# Patient Record
Sex: FEMALE | Race: BLACK OR AFRICAN AMERICAN | Employment: FULL TIME | URBAN - METROPOLITAN AREA
[De-identification: names, ages, dates, MRNs, and addresses within clinical notes are randomized per-mention and may not be internally consistent; named-entity substitution may affect disease eponyms.]

---

## 2018-09-27 ENCOUNTER — HOSPITAL ENCOUNTER (OUTPATIENT)
Dept: OCCUPATIONAL MEDICINE | Age: 53
Discharge: HOME | End: 2018-09-27

## 2018-09-27 DIAGNOSIS — T14.90XA INJURY: ICD-10-CM

## 2018-11-28 ENCOUNTER — HOSPITAL ENCOUNTER (OUTPATIENT)
Dept: PHYSICAL THERAPY | Age: 53
Discharge: HOME | End: 2018-11-28
Payer: COMMERCIAL

## 2018-11-28 PROCEDURE — 97110 THERAPEUTIC EXERCISES: CPT

## 2018-11-28 PROCEDURE — 97161 PT EVAL LOW COMPLEX 20 MIN: CPT

## 2018-11-28 PROCEDURE — 97140 MANUAL THERAPY 1/> REGIONS: CPT

## 2018-11-28 NOTE — THERAPY EVALUATION
Romero Anson  : 1965  Payor: Henry Acharya / Plan: Floridalma Mendoza / Product Type: Workers Comp /  [address]  at [facility], [address], [place] [address]  [phone]   [phone]        [address] 52 Assessment 2018   ICD-10: Treatment Diagnosis: Pain in right hand (M79.641),   Precautions/Allergies:   Patient has no allergy information on record. MD Orders: evaluate and treat. Work restrictions: limit use of right thumb, wear thumb spica MEDICAL/REFERRING DIAGNOSIS:  Pain in right hand [M79.641]  Radial styloid tenosynovitis (de quervain) [M65.4]   DATE OF ONSET: summer 2018  REFERRING PHYSICIAN: Fernando Cabrera MD  RETURN PHYSICIAN APPOINTMENT: --     INITIAL ASSESSMENT:  Ms. Dashawn Michael presents with R thumb pain, limited movement at the R [facility] joint, and indications of De Quervains tenosynovitis. Pt will benefit from skilled physical therapy to address dysfunctions and pain. PROBLEM LIST (Impacting functional limitations):  1. Decreased Strength  2. Decreased ADL/Functional Activities  3. Increased Pain  4. Decreased Flexibility/Joint Mobility  5. Decreased [place] with Home Exercise Program INTERVENTIONS PLANNED:  1. Home Exercise Program (HEP)  2. Manual Therapy including joint and soft tissue manipulation and mobilization, and dry needling  3. Therapeutic Exercise/Strengthening  4. Modalities including heat/cold application and electrical stimulation   TREATMENT PLAN:  Effective Dates: 18 TO 19. Frequency/Duration: 2 times a week for 3 weeks  GOALS: (Goals have been discussed and agreed upon with patient.)  Short-Term Functional Goals: Time Frame: 3 weeks  1. Pt will be independent with > or = 2 exercises in HEP. 2. Pt will demonstrate > 5 degrees improvement in thumb movement all directions  3. Pt will report < or = 37 on DASH. Discharge Goals: Time Frame: 6 weeks  1.  Pt will be independent with > or = 4 exercises in HEP. 2. Pt will report < or = 28 on DASH. 3. Pt will demonstrate functional R thumb AROM and strength. Rehabilitation Potential For Stated Goals: Good  Regarding Darleen Cornejo's therapy, I certify that the treatment plan above will be carried out by a therapist or under their direction. Thank you for this referral,  Trisha Field, PT, DPT                 The information in this section was collected on 11/28/18 (except where otherwise noted). HISTORY:   History of Present Injury/Illness (Reason for Referral):  Pt has R thumb, wrist pain. 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 Low levels of pain for about 1 year, but the pain became bad this summer and she went open workers comp claim in early fall. Has been through PT for this previously this fall, resulted in no change. Radiographs found potential old broken bones in R CMC region and tendonitis. Past Medical History/Comorbidities:   Ms. Kenya Gutierrez  has no past medical history on file. Ms. Kenya Gutierrez  has no past surgical history on file. Social History/Living Environment:     house  Prior Level of Function/Work/Activity:  Not currently working due to restrictions  Dominant Side:         RIGHT       Ambulatory/Rehab Services H2 Model Falls Risk Assessment    Risk Factors:       No Risk Factors Identified Ability to Rise from Chair:       (0)  Ability to rise in a single movement    Falls Prevention Plan:       No modifications necessary   Total: (5 or greater = High Risk): 0    ©2010 [facility] [address] [place] [address] States Patent #[id number]. Federal Law prohibits the replication, distribution or use without written permission from [facility] [employer]     Current Medications:     No current outpatient medications on file.    Date Last Reviewed:  11/28/2018   Number of Personal Factors/Comorbidities that affect the Plan of Care: 0: LOW COMPLEXITY   EXAMINATION: Observation/Orthostatic Postural Assessment:          Standing: no abnormalities noted        Ambulation: no abnormalities noted    Palpation:          Increased tenderness to palpation over R thumb DIP and CMC joints, and trigger points in thenar eminence        Increased tenderness to cross friction palpation of tendons over radial styloid R hand    ROM/Strength  Joint/Movement  Range of Motion- eval ROM - reassess  Date:  Strength- eval Strength - reassess  Date:   Thumb extension R: 45 degrees  L: WFL  3+/5    Thumb flexion R: 10 degrees  L: WFL  3/5 with pain    Thumb abduction R: 40 degrees  L: WFL  3+/5 with pain     (in lbs of force)   R: 19, 17, 17  L: 22, 25, 23        Balance:          No deficits noted    Special test:      Finklestein's: positive for de quervains     Body Structures Involved:  1. Bones  2. Joints  3. Muscles Body Functions Affected:  1. Sensory/Pain  2. Neuromusculoskeletal  3. Movement Related Activities and Participation Affected:  1. General Tasks and Demands  2. Mobility  3. Community, Social and [place]   Number of elements (examined above) that affect the Plan of Care: 1-2: LOW COMPLEXITY   CLINICAL PRESENTATION:   Presentation: Stable and uncomplicated: LOW COMPLEXITY   CLINICAL DECISION MAKING:   Outcome Measure: Tool Used: Disabilities of the Arm, Shoulder and Hand (DASH) Questionnaire - Quick Version  Score:  Initial: 42/55  Most Recent: X/55 (Date: -- )   Interpretation of Score: The DASH is designed to measure the activities of daily living in person's with upper extremity dysfunction or pain. Each section is scored on a 1-5 scale, 5 representing the greatest disability. The scores of each section are added together for a total score of 55. Score 11 12-19 20-28 29-37 38-45 46-54 55   Modifier CH CI CJ CK CL CM CN     Medical Necessity:   · Skilled intervention continues to be required due to decreased function.   Reason for Services/Other Comments:  · Patient continues to require skilled intervention due to decreased function. Use of outcome tool(s) and clinical judgement create a POC that gives a: Clear prediction of patient's progress: LOW COMPLEXITY            TREATMENT:   (In addition to Assessment/Re-Assessment sessions the following treatments were rendered)  Pre-treatment Symptoms/Complaints:  5/10 pain with all thumb movements  Pain: Initial:     5/10 Post Session:  5/10     THERAPEUTIC EXERCISE: (10 minutes):  Exercises per grid below to improve mobility and strength. Date:  11/28 Date:   Date:     Activity/Exercise Parameters Parameters Parameters         Thumb flexion/extension X 10     Thumb abduction X 10                                 MANUAL THERAPY: (10 minutes): To increase motion and reduce pain  - distraction at R [facility] joint  - PROM of R CMC joint    MODALITIES: ( 10 minutes): for pain reduction  - cold pack to R hand     Treatment/Session Assessment:    · Response to Treatment:  Pt demonstrated understanding of exercises, reported decreased stiffness of right thumb after manual therapy. Encouraged her to not wear brace on thumb when not using hand, use brace when doing lifting, gripping activities. · Compliance with Program/Exercises: Will assess as treatment progresses. · Recommendations/Intent for next treatment session: \"Next visit will focus on advancements to more challenging activities\".   Total Treatment Duration:  PT Patient Time In/Time Out  Time In: 0945  Time Out: 6629    Future Appointments   Date Time Provider Jamaal Russell   11/30/2018  9:15 AM Deja Connelly, PT, DPT [facility]   12/5/2018  8:30 AM Deja Connelly PT, DPT [facility]   12/7/2018  8:30 AM Deja Connelly PT, DPT [facility]   12/10/2018 10:00 AM Deja Connelly PT, DPT [facility]   12/12/2018  9:45 AM Charly Hayden, PT, DPT [facility]       Chelsey Moreno, PT, DPT

## 2018-11-30 ENCOUNTER — HOSPITAL ENCOUNTER (OUTPATIENT)
Dept: PHYSICAL THERAPY | Age: 53
Discharge: HOME | End: 2018-11-30
Payer: COMMERCIAL

## 2018-11-30 PROCEDURE — 97140 MANUAL THERAPY 1/> REGIONS: CPT

## 2018-11-30 PROCEDURE — 97110 THERAPEUTIC EXERCISES: CPT

## 2018-11-30 NOTE — PROGRESS NOTES
Raymundo Lara  : 1965  Payor: Sindy Dove / Plan: Leldon Adam / Product Type: Workers Comp /  [address]  at [facility], [address], [place]  [phone]   [phone]        OUTPATIENT PHYSICAL THERAPY:Daily Note 2018   ICD-10: Treatment Diagnosis: Pain in right hand (M79.641),   Precautions/Allergies:   Patient has no allergy information on record. MD Orders: evaluate and treat. Work restrictions: limit use of right thumb, wear thumb spica MEDICAL/REFERRING DIAGNOSIS:  Pain in right hand [M79.641]  Radial styloid tenosynovitis (de quervain) [M65.4]   DATE OF ONSET: summer 2018  REFERRING PHYSICIAN: Russ Beckham MD  RETURN PHYSICIAN APPOINTMENT: --     INITIAL ASSESSMENT:  Ms. Shon Gill presents with R thumb pain, limited movement at the R [facility] joint, and indications of De Quervains tenosynovitis. Pt will benefit from skilled physical therapy to address dysfunctions and pain. PROBLEM LIST (Impacting functional limitations):  1. Decreased Strength  2. Decreased ADL/Functional Activities  3. Increased Pain  4. Decreased Flexibility/Joint Mobility  5. Decreased [place] with Home Exercise Program INTERVENTIONS PLANNED:  1. Home Exercise Program (HEP)  2. Manual Therapy including joint and soft tissue manipulation and mobilization, and dry needling  3. Therapeutic Exercise/Strengthening  4. Modalities including heat/cold application and electrical stimulation   TREATMENT PLAN:  Effective Dates: 18 TO 19. Frequency/Duration: 2 times a week for 3 weeks  GOALS: (Goals have been discussed and agreed upon with patient.)  Short-Term Functional Goals: Time Frame: 3 weeks  1. Pt will be independent with > or = 2 exercises in HEP. 2. Pt will demonstrate > 5 degrees improvement in thumb movement all directions  3. Pt will report < or = 37 on DASH. Discharge Goals: Time Frame: 6 weeks  1.  Pt will be independent with > or = 4 exercises in HEP. 2. Pt will report < or = 28 on DASH. 3. Pt will demonstrate functional R thumb AROM and strength. Rehabilitation Potential For Stated Goals: Good  Regarding Darleen Cornejo's therapy, I certify that the treatment plan above will be carried out by a therapist or under their direction. Thank you for this referral,  Nam Macias, PT, DPT                 The information in this section was collected on 11/28/18 (except where otherwise noted). HISTORY:   History of Present Injury/Illness (Reason for Referral):  Pt has R thumb, wrist pain. 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 Low levels of pain for about 1 year, but the pain became bad this summer and she went open workers comp claim in early fall. Has been through PT for this previously this fall, resulted in no change. Radiographs found potential old broken bones in R CMC region and tendonitis. Past Medical History/Comorbidities:   Ms. Yasmin Interiano  has no past medical history on file. Ms. Yasmin Interiano  has no past surgical history on file. Social History/Living Environment:     house  Prior Level of Function/Work/Activity:  Not currently working due to restrictions  Dominant Side:         RIGHT       Ambulatory/Rehab Services H2 Model Falls Risk Assessment    Risk Factors:       No Risk Factors Identified Ability to Rise from Chair:       (0)  Ability to rise in a single movement    Falls Prevention Plan:       No modifications necessary   Total: (5 or greater = High Risk): 0    ©2010 [facility] [address] [place] [address] States Patent #[id number]. Federal Law prohibits the replication, distribution or use without written permission from [facility] [employer]     Current Medications:     No current outpatient medications on file.    Date Last Reviewed:  11/30/2018   Number of Personal Factors/Comorbidities that affect the Plan of Care: 0: LOW COMPLEXITY   EXAMINATION:   Observation/Orthostatic Postural Assessment:          Standing: no abnormalities noted        Ambulation: no abnormalities noted    Palpation:          Increased tenderness to palpation over R thumb DIP and CMC joints, and trigger points in thenar eminence        Increased tenderness to cross friction palpation of tendons over radial styloid R hand    ROM/Strength  Joint/Movement  Range of Motion- eval ROM - reassess  Date:  Strength- eval Strength - reassess  Date:   Thumb extension R: 45 degrees  L: WFL  3+/5    Thumb flexion R: 10 degrees  L: WFL  3/5 with pain    Thumb abduction R: 40 degrees  L: WFL  3+/5 with pain     (in lbs of force)   R: 19, 17, 17  L: 22, 25, 23        Balance:          No deficits noted    Special test:      Finklestein's: positive for de quervains     Body Structures Involved:  1. Bones  2. Joints  3. Muscles Body Functions Affected:  1. Sensory/Pain  2. Neuromusculoskeletal  3. Movement Related Activities and Participation Affected:  1. General Tasks and Demands  2. Mobility  3. Community, Social and [place]   Number of elements (examined above) that affect the Plan of Care: 1-2: LOW COMPLEXITY   CLINICAL PRESENTATION:   Presentation: Stable and uncomplicated: LOW COMPLEXITY   CLINICAL DECISION MAKING:   Outcome Measure: Tool Used: Disabilities of the Arm, Shoulder and Hand (DASH) Questionnaire - Quick Version  Score:  Initial: 42/55  Most Recent: X/55 (Date: -- )   Interpretation of Score: The DASH is designed to measure the activities of daily living in person's with upper extremity dysfunction or pain. Each section is scored on a 1-5 scale, 5 representing the greatest disability. The scores of each section are added together for a total score of 55. Score 11 12-19 20-28 29-37 38-45 46-54 55   Modifier CH CI CJ CK CL CM CN     Medical Necessity:   · Skilled intervention continues to be required due to decreased function.   Reason for Services/Other Comments:  · Patient continues to require skilled intervention due to decreased function. Use of outcome tool(s) and clinical judgement create a POC that gives a: Clear prediction of patient's progress: LOW COMPLEXITY            TREATMENT:   (In addition to Assessment/Re-Assessment sessions the following treatments were rendered)  Pre-treatment Symptoms/Complaints:  Pt reports she has been doing the exercises over the past day. Pain: Initial:     5/10 Post Session:  5/10     THERAPEUTIC EXERCISE: (25 minutes):  Exercises per grid below to improve mobility and strength. Date:  11/28 Date:  11/30 Date:     Activity/Exercise Parameters Parameters Parameters   ube  5 min level 1    Thumb flexion/extension X 10 X 10    Thumb abduction X 10 X 10    Palm up thumb lift  X 10    Palm up thumb opposition  X 10    Thenar stretch  10 sec hold x 5    Stretch for radial side of thumb/wrist  10 sec hold x 5    Radial deviation  X 10    Thumb extension  Rubber band resistance x 10    Wrist extension  2# x 20    Wrist flexion  2# x 20              MANUAL THERAPY: (15 minutes): To increase motion and reduce pain  - distraction at R [facility] joint  - PROM of R CMC joint  - MFR manually and using Edge tool to thenar eminence and wrist radial deviator muscles    MODALITIES: ( 5 minutes): for pain reduction  - cold pack to R hand     Treatment/Session Assessment:    · Response to Treatment:  Pt demonstrated small improvement in thumb ROM following manual therapy, reported that the manual therapy \"felt good\". Added thenar stretch to HEP. · Compliance with Program/Exercises: Will assess as treatment progresses. · Recommendations/Intent for next treatment session: \"Next visit will focus on advancements to more challenging activities\".   Total Treatment Duration:  PT Patient Time In/Time Out  Time In: 0930  Time Out: 1025    Future Appointments   Date Time Provider Jamaal Russell   12/5/2018  8:30 AM Cassy Nguyen PT, DPT [facility]   12/7/2018  8:30 AM Jackelyn Vernell Concepcion, PT, DPT [facility]   12/10/2018 10:00 AM Germ[facility]elles PT, [facility]   12/12/2018  9:45 AM [facility]       Valery Crouch, PT, DPT

## 2018-12-07 ENCOUNTER — HOSPITAL ENCOUNTER (OUTPATIENT)
Dept: PHYSICAL THERAPY | Age: 53
Discharge: HOME | End: 2018-12-07
Payer: COMMERCIAL

## 2018-12-07 PROCEDURE — 97014 ELECTRIC STIMULATION THERAPY: CPT

## 2018-12-07 PROCEDURE — 97110 THERAPEUTIC EXERCISES: CPT

## 2018-12-07 PROCEDURE — 97140 MANUAL THERAPY 1/> REGIONS: CPT

## 2018-12-07 PROCEDURE — 97035 APP MDLTY 1+ULTRASOUND EA 15: CPT

## 2018-12-07 NOTE — PROGRESS NOTES
Rudy Dakin  : 1965  Payor: Shayla Melton / Plan: Ridge Almodovar / Product Type: Workers Comp /  [address]  [facility], [address], [address]  [phone]   [phone]        OUTPATIENT PHYSICAL [address] Note 2018   ICD-10: Treatment Diagnosis: Pain in right hand (M79.641),   Precautions/Allergies:   Patient has no allergy information on record. MD Orders: evaluate and treat. Work restrictions: limit use of right thumb, wear thumb spica MEDICAL/REFERRING DIAGNOSIS:  Pain in right hand [M79.641]  Radial styloid tenosynovitis (de quervain) [M65.4]   DATE OF ONSET: summer 2018  REFERRING PHYSICIAN: Rich Andino MD  RETURN PHYSICIAN APPOINTMENT: --     INITIAL ASSESSMENT:  Ms. Angella Prather presents with R thumb pain, limited movement at the R Aia 16 joint, and indications of De Quervains tenosynovitis. Pt will benefit from skilled physical therapy to address dysfunctions and pain. PROBLEM LIST (Impacting functional limitations):  1. Decreased Strength  2. Decreased ADL/Functional Activities  3. Increased Pain  4. Decreased Flexibility/Joint Mobility  5. Decreased [place] with Home Exercise Program INTERVENTIONS PLANNED:  1. Home Exercise Program (HEP)  2. Manual Therapy including joint and soft tissue manipulation and mobilization, and dry needling  3. Therapeutic Exercise/Strengthening  4. Modalities including heat/cold application and electrical stimulation   TREATMENT PLAN:  Effective Dates: 18 TO 19. Frequency/Duration: 2 times a week for 3 weeks  GOALS: (Goals have been discussed and agreed upon with patient.)  Short-Term Functional Goals: Time Frame: 3 weeks  1. Pt will be independent with > or = 2 exercises in HEP. 2. Pt will demonstrate > 5 degrees improvement in thumb movement all directions  3. Pt will report < or = 37 on DASH. Discharge Goals: Time Frame: 6 weeks  1.  Pt will be independent with > or = 4 exercises in HEP. 2. Pt will report < or = 28 on DASH. 3. Pt will demonstrate functional R thumb AROM and strength. Rehabilitation Potential For Stated Goals: Good  Regarding Darleen Cornejo's therapy, I certify that the treatment plan above will be carried out by a therapist or under their direction. Thank you for this referral,  Chelsey Moreno, PT, DPT                 The information in this section was collected on 11/28/18 (except where otherwise noted). HISTORY:   History of Present Injury/Illness (Reason for Referral):  Pt has R thumb, wrist pain. 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 Low levels of pain for about 1 year, but the pain became bad this summer and she went open workers comp claim in early fall. Has been through PT for this previously this fall, resulted in no change. Radiographs found potential old broken bones in R CMC region and tendonitis. Past Medical History/Comorbidities:   Ms. Shagufta Noe  has no past medical history on file. Ms. Shagufta Noe  has no past surgical history on file. Social History/Living Environment:     house  Prior Level of Function/Work/Activity:  Not currently working due to restrictions  Dominant Side:         RIGHT       Ambulatory/Rehab Services H2 Model Falls Risk Assessment    Risk Factors:       No Risk Factors Identified Ability to Rise from Chair:       (0)  Ability to rise in a single movement    Falls Prevention Plan:       No modifications necessary   Total: (5 or greater = High Risk): 0    ©2010 [facility] Ronald [address] [place] [address] States Patent #[id number]. Federal Law prohibits the replication, distribution or use without written permission from [facility] [employer]     Current Medications:     No current outpatient medications on file.    Date Last Reviewed:  12/7/2018   Number of Personal Factors/Comorbidities that affect the Plan of Care: 0: LOW COMPLEXITY   EXAMINATION:   Observation/Orthostatic Postural Assessment:          Standing: no abnormalities noted        Ambulation: no abnormalities noted    Palpation:          Increased tenderness to palpation over R thumb DIP and CMC joints, and trigger points in thenar eminence        Increased tenderness to cross friction palpation of tendons over radial styloid R hand    ROM/Strength  Joint/Movement  Range of Motion- eval ROM - reassess  Date:  Strength- eval Strength - reassess  Date:   Thumb extension R: 45 degrees  L: WFL  3+/5    Thumb flexion R: 10 degrees  L: WFL  3/5 with pain    Thumb abduction R: 40 degrees  L: WFL  3+/5 with pain     (in lbs of force)   R: 19, 17, 17  L: 22, 25, 23        Balance:          No deficits noted    Special test:      Finklestein's: positive for de quervains     Body Structures Involved:  1. Bones  2. Joints  3. Muscles Body Functions Affected:  1. Sensory/Pain  2. Neuromusculoskeletal  3. Movement Related Activities and Participation Affected:  1. General Tasks and Demands  2. Mobility  3. Community, Social and [place]   Number of elements (examined above) that affect the Plan of Care: 1-2: LOW COMPLEXITY   CLINICAL PRESENTATION:   Presentation: Stable and uncomplicated: LOW COMPLEXITY   CLINICAL DECISION MAKING:   Outcome Measure: Tool Used: Disabilities of the Arm, Shoulder and Hand (DASH) Questionnaire - Quick Version  Score:  Initial: 42/55  Most Recent: X/55 (Date: -- )   Interpretation of Score: The DASH is designed to measure the activities of daily living in person's with upper extremity dysfunction or pain. Each section is scored on a 1-5 scale, 5 representing the greatest disability. The scores of each section are added together for a total score of 55. Score 11 12-19 20-28 29-37 38-45 46-54 55   Modifier CH CI CJ CK CL CM CN     Medical Necessity:   · Skilled intervention continues to be required due to decreased function.   Reason for Services/Other Comments:  · Patient continues to require skilled intervention due to decreased function. Use of outcome tool(s) and clinical judgement create a POC that gives a: Clear prediction of patient's progress: LOW COMPLEXITY            TREATMENT:   (In addition to Assessment/Re-Assessment sessions the following treatments were rendered)  Pre-treatment Symptoms/Complaints:  Pt reports 7/10 pain with movement  Pain: Initial:     7/10 Post Session: pain reduced to 6/10 with movement after treatment     THERAPEUTIC EXERCISE: (25 minutes):  Exercises per grid below to improve mobility and strength. Date:  11/28 Date:  11/30 Date:  12/5 12/7   Activity/Exercise Parameters Parameters Parameters    ube  5 min level 1 5 min level 1 4/4 level 1   Thumb flexion/extension X 10 X 10 X 15 X 15   Thumb abduction X 10 X 10 X 15 X 15   Palm up thumb lift  X 10 X 15 X 15   Palm up thumb opposition  X 10 X 15 X 15   Thenar stretch  10 sec hold x 5 10 sec hold x 5 10 sec hold x 5   Stretch for radial side of thumb/wrist  10 sec hold x 5 10 sec hold x 5 5 sec hold x 5   Radial deviation  X 10 X 10 X 15, 2#   Thumb extension  Rubber band resistance x 10 Rubber band resistance x 15 Rubber band resistance   x 15   Thumb flexion   Rubber band resistance x 15 Rubber band resistance x 15   Wrist extension  2# x 20 2# x 20 2# x 20   Wrist flexion  2# x 20 2# x 20 2# x 20              MANUAL THERAPY: (15 minutes): To increase motion and reduce pain  - distraction at R [facility] joint  - PROM of R CMC joint  - MFR manually to thenar eminence and wrist radial deviator muscles    MODALITIES: ( 15 minutes): for pain reduction  - electrical stimulation using H-wave, high intensity with one channel across R CMC joint, intensity adjusted to pt tolerance    ULTRASOUND (10 minutes): for pain reduction and increased movement  - 1 MHz,, 100% duty cycle, 1.2 W/cm2     Treatment/Session Assessment:    · Response to Treatment:  Pt tolerated exercises better today, still similar pain levels though. · Compliance with Program/Exercises: Will assess as treatment progresses. · Recommendations/Intent for next treatment session: \"Next visit will focus on advancements to more challenging activities\".   Total Treatment Duration:  PT Patient Time In/Time Out  Time In: 0830  Time Out: 0945    Future Appointments   Date Time Provider Jamaal Russell   12/10/2018 10:00 AM Rima Anand, PT, DPT [facility]   12/12/2018  9:45 AM Bertha Hayden, PT, DPT [facility], PT, DPT

## 2018-12-12 ENCOUNTER — HOSPITAL ENCOUNTER (OUTPATIENT)
Dept: PHYSICAL THERAPY | Age: 53
Discharge: HOME | End: 2018-12-12
Payer: COMMERCIAL

## 2018-12-12 PROCEDURE — 97110 THERAPEUTIC EXERCISES: CPT

## 2018-12-12 PROCEDURE — 97035 APP MDLTY 1+ULTRASOUND EA 15: CPT

## 2018-12-12 PROCEDURE — 97140 MANUAL THERAPY 1/> REGIONS: CPT

## 2018-12-12 PROCEDURE — 97014 ELECTRIC STIMULATION THERAPY: CPT

## 2018-12-12 NOTE — PROGRESS NOTES
Cale Tillman  : 1965  Payor: Ean Donte / Plan: Xigen Ayr / Product Type: Workers Comp /  [address] at [address], [address] [address], [place]  [phone]   [phone]        OUTPATIENT PHYSICAL THERAPY:Daily Note and Recertification [id number]   ICD-10: Treatment Diagnosis: Pain in right hand (M79.641),   Precautions/Allergies:   Patient has no allergy information on record. MD Orders: evaluate and treat. Work restrictions: limit use of right thumb, wear thumb spica MEDICAL/REFERRING DIAGNOSIS:  Pain in right hand [M79.641]  Radial styloid tenosynovitis (de quervain) [M65.4]   DATE OF ONSET: summer 2018  REFERRING PHYSICIAN: Pool Duran MD  RETURN PHYSICIAN APPOINTMENT: --     INITIAL ASSESSMENT:  Ms. Zoey Petersen has attended 5 physical therapy treatments for thumb and wrist pain and is approved/scheduled for one more. We have focused on increasing motion at the Aia 16 joint and calming down symptoms. We have made progress in ROM tolerated and improvement in pain levels during the movements. Cale Tillman still demonstrates significant limitations in thumb/CMC joint movement and associated strength and would continue to benefit from 12 more appointments for skilled physical therapy. PROBLEM LIST (Impacting functional limitations):  1. Decreased Strength  2. Decreased ADL/Functional Activities  3. Increased Pain  4. Decreased Flexibility/Joint Mobility  5. Decreased [place] with Home Exercise Program INTERVENTIONS PLANNED:  1. Home Exercise Program (HEP)  2. Manual Therapy including joint and soft tissue manipulation and mobilization, and dry needling  3. Therapeutic Exercise/Strengthening  4. Modalities including heat/cold application and electrical stimulation   TREATMENT PLAN:  Effective Dates: 18 TO 19.   Frequency/Duration: 2 times a week for 3 weeks  GOALS: (Goals have been discussed and agreed upon with patient.)  Short-Term Functional Goals: Time Frame: 3 weeks  1. Pt will be independent with > or = 2 exercises in HEP. met  2. Pt will demonstrate > 5 degrees improvement in thumb movement all directions. Partially met  3. Pt will report < or = 37 on DASH. ongoiing  Discharge Goals: Time Frame: 6 weeks  1. Pt will be independent with > or = 4 exercises in HEP. ongoing  2. Pt will report < or = 28 on DASH. ongoing  3. Pt will demonstrate functional R thumb AROM and strength. ongoing  Rehabilitation Potential For Stated Goals: Good  Regarding Darleen Cornejo's therapy, I certify that the treatment plan above will be carried out by a therapist or under their direction. Thank you for this referral,  Manuel Obregon, PT, DPT       Referring Physician Signature: Ruth Bass MD          Date                      The information in this section was collected on 11/28/18 (except where otherwise noted). HISTORY:   History of Present Injury/Illness (Reason for Referral):  Pt has R thumb, wrist pain. 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 Low levels of pain for about 1 year, but the pain became bad this summer and she went open workers comp claim in early fall. Has been through PT for this previously this fall, resulted in no change. Radiographs found potential old broken bones in R CMC region and tendonitis. Past Medical History/Comorbidities:   Ms. Cecil Schafer  has no past medical history on file. Ms. Cecil Schafer  has no past surgical history on file.   Social History/Living Environment:     house  Prior Level of Function/Work/Activity:  Not currently working due to restrictions  Dominant Side:         RIGHT       Ambulatory/Rehab Services H2 Model Falls Risk Assessment    Risk Factors:       No Risk Factors Identified Ability to Rise from Chair:       (0)  Ability to rise in a single movement    Falls Prevention Plan:       No modifications necessary   Total: (5 or greater = High Risk): 0    ©2010 [facility] Ronald [address] [place], [place] [address]on [place] Patent #[id number]. Federal Law prohibits the replication, distribution or use without written permission from [facility] "[employer]     Current Medications:     No current outpatient medications on file. Date Last Reviewed:  12/12/2018   Number of Personal Factors/Comorbidities that affect the Plan of Care: 0: LOW COMPLEXITY   EXAMINATION:   Observation/Orthostatic Postural Assessment:          Standing: no abnormalities noted        Ambulation: no abnormalities noted    Palpation:          Increased tenderness to palpation over R thumb DIP and CMC joints, and trigger points in thenar eminence        Increased tenderness to cross friction palpation of tendons over radial styloid R hand    ROM/Strength  Joint/Movement  Range of Motion- eval ROM - reassess  Date: 12/12/18  Strength- eval Strength - reassess  Date: 12/12/18   Thumb extension R: 45 degrees  L: WFL R: 45 deg (with less pain than previous) 3+/5 3+/5   Thumb flexion R: 10 degrees  L: WFL R: 15 deg (with less pain than previous) 3/5 with pain 3+/5 with pain   Thumb abduction R: 40 degrees  L: WFL R: 45 deg (with less pain than previous) 3+/5 with pain 3+/5 with pain    (in lbs of force)   R: 19, 17, 17  L: 22, 25, 23        Balance:          No deficits noted    Special test:      Finklestein's: positive for de quervains     Body Structures Involved:  1. Bones  2. Joints  3. Muscles Body Functions Affected:  1. Sensory/Pain  2. Neuromusculoskeletal  3. Movement Related Activities and Participation Affected:  1. General Tasks and Demands  2. Mobility  3. Community, Social and [place]   Number of elements (examined above) that affect the Plan of Care: 1-2: LOW COMPLEXITY   CLINICAL PRESENTATION:   Presentation: Stable and uncomplicated: LOW COMPLEXITY   CLINICAL DECISION MAKING:   Outcome Measure:    Tool Used: Disabilities of the Arm, Shoulder and Hand (DASH) Questionnaire - Quick Version  Score:  Initial: 42/55  Most Recent: X/55 (Date: -- )   Interpretation of Score: The DASH is designed to measure the activities of daily living in person's with upper extremity dysfunction or pain. Each section is scored on a 1-5 scale, 5 representing the greatest disability. The scores of each section are added together for a total score of 55. Score 11 12-19 20-28 29-37 38-45 46-54 55   Modifier CH CI CJ CK CL CM CN     Medical Necessity:   · Skilled intervention continues to be required due to decreased function. Reason for Services/Other Comments:  · Patient continues to require skilled intervention due to decreased function. Use of outcome tool(s) and clinical judgement create a POC that gives a: Clear prediction of patient's progress: LOW COMPLEXITY            TREATMENT:   (In addition to Assessment/Re-Assessment sessions the following treatments were rendered)  Pre-treatment Symptoms/Complaints:  Pt reports 6/10 pain, feels a little better than it has previously  Pain: Initial:     6/10 Post Session: 6/10     THERAPEUTIC EXERCISE: (25 minutes):  Exercises per grid below to improve mobility and strength.     Date:  11/28 Date:  11/30 Date:  12/5 12/7 12/12   Activity/Exercise Parameters Parameters Parameters     ube  5 min level 1 5 min level 1 4/4 level 1 4/4 level 1   Thumb flexion/extension X 10 X 10 X 15 X 15 X 15   Thumb abduction X 10 X 10 X 15 X 15 X 15   Palm up thumb lift  X 10 X 15 X 15 X 15   Palm up thumb opposition  X 10 X 15 X 15 X 15   Thenar stretch  10 sec hold x 5 10 sec hold x 5 10 sec hold x 5 10 sec hold x 5   Stretch for radial side of thumb/wrist  10 sec hold x 5 10 sec hold x 5 5 sec hold x 5 5 sec hold x 5   Radial deviation  X 10 X 10 X 15, 2# X 15, 2#   Thumb extension  Rubber band resistance x 10 Rubber band resistance x 15 Rubber band resistance   x 15 Rubber band resistance   x 15   Thumb flexion   Rubber band resistance x 15 Rubber band resistance x 15 Rubber band resistance   x 15   Wrist extension  2# x 20 2# x 20 2# x 20 2# x 20   Wrist flexion  2# x 20 2# x 20 2# x 20 2# x 20               MANUAL THERAPY: (15 minutes): To increase motion and reduce pain  - distraction at R [facility] joint  - PROM of R CMC joint  - MFR manually to thenar eminence and wrist radial deviator muscles    MODALITIES: ( 15 minutes): for pain reduction  - electrical stimulation using H-wave, high intensity with one channel across R CMC joint, intensity adjusted to pt tolerance    ULTRASOUND (10 minutes): for pain reduction and increased movement  - 1 MHz,, 100% duty cycle, 1.2 W/cm2     Treatment/Session Assessment:    · Response to Treatment:  Pt tolerated exercises well, modalities felt good. Pt does seem to be improving. · Compliance with Program/Exercises: Will assess as treatment progresses. · Recommendations/Intent for next treatment session: \"Next visit will focus on advancements to more challenging activities\".   Total Treatment Duration:  PT Patient Time In/Time Out  Time In: 1545  Time Out: 1700    Future Appointments   Date Time Provider Jamaal Russell   12/17/2018 10:00 AM [facility]oretta Hayden, PT, DPT [facility]       Felipe Rodas, PT, DPT

## 2018-12-17 ENCOUNTER — HOSPITAL ENCOUNTER (OUTPATIENT)
Dept: PHYSICAL THERAPY | Age: 53
Discharge: HOME | End: 2018-12-17
Payer: COMMERCIAL

## 2018-12-17 PROCEDURE — 97035 APP MDLTY 1+ULTRASOUND EA 15: CPT

## 2018-12-17 PROCEDURE — 97014 ELECTRIC STIMULATION THERAPY: CPT

## 2018-12-17 PROCEDURE — 97140 MANUAL THERAPY 1/> REGIONS: CPT

## 2018-12-17 PROCEDURE — 97110 THERAPEUTIC EXERCISES: CPT

## 2018-12-17 NOTE — PROGRESS NOTES
Larisa Lees  : 1965  Payor: Lina Dupont / Plan: Tim Bazan / Product Type: Workers Comp /  [address]  at [address]  [address], [address], [address]  [phone]   [phone]        OUTPATIENT PHYSICAL THERAPY:Daily Note 2018   ICD-10: Treatment Diagnosis: Pain in right hand (M79.641),   Precautions/Allergies:   Patient has no allergy information on record. MD Orders: evaluate and treat. Work restrictions: limit use of right thumb, wear thumb spica MEDICAL/REFERRING DIAGNOSIS:  Pain in right hand [M79.641]  Radial styloid tenosynovitis (de quervain) [M65.4]   DATE OF ONSET: summer 2018  REFERRING PHYSICIAN: Ricardo Calderon MD  RETURN PHYSICIAN APPOINTMENT: --     INITIAL ASSESSMENT:  Ms. Kenya Gutierrez has attended 5 physical therapy treatments for thumb and wrist pain and is approved/scheduled for one more. We have focused on increasing motion at the [facility] joint and calming down symptoms. We have made progress in ROM tolerated and improvement in pain levels during the movements. Larisa Lees still demonstrates significant limitations in thumb/CMC joint movement and associated strength and would continue to benefit from 12 more appointments for skilled physical therapy. PROBLEM LIST (Impacting functional limitations):  1. Decreased Strength  2. Decreased ADL/Functional Activities  3. Increased Pain  4. Decreased Flexibility/Joint Mobility  5. Decreased [place] with Home Exercise Program INTERVENTIONS PLANNED:  1. Home Exercise Program (HEP)  2. Manual Therapy including joint and soft tissue manipulation and mobilization, and dry needling  3. Therapeutic Exercise/Strengthening  4. Modalities including heat/cold application and electrical stimulation   TREATMENT PLAN:  Effective Dates: 18 TO 19.   Frequency/Duration: 2 times a week for 3 weeks  GOALS: (Goals have been discussed and agreed upon with patient.)  Short-Term Functional Goals: Time Frame: 3 weeks  1. Pt will be independent with > or = 2 exercises in HEP. met  2. Pt will demonstrate > 5 degrees improvement in thumb movement all directions. Partially met  3. Pt will report < or = 37 on DASH. ongoiing  Discharge Goals: Time Frame: 6 weeks  1. Pt will be independent with > or = 4 exercises in HEP. ongoing  2. Pt will report < or = 28 on DASH. ongoing  3. Pt will demonstrate functional R thumb AROM and strength. ongoing  Rehabilitation Potential For Stated Goals: Good  Regarding Darleen Cornejo's therapy, I certify that the treatment plan above will be carried out by a therapist or under their direction. Thank you for this referral,  Maryclare Hatchet, PT, DPT       Referring Physician Signature: Leonel Ashley MD          Date                      The information in this section was collected on 11/28/18 (except where otherwise noted). HISTORY:   History of Present Injury/Illness (Reason for Referral):  Pt has R thumb, wrist pain. 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 Low levels of pain for about 1 year, but the pain became bad this summer and she went open workers comp claim in early fall. Has been through PT for this previously this fall, resulted in no change. Radiographs found potential old broken bones in R CMC region and tendonitis. Past Medical History/Comorbidities:   Ms. Michel Conte  has no past medical history on file. Ms. Michel Conte  has no past surgical history on file.   Social History/Living Environment:     house  Prior Level of Function/Work/Activity:  Not currently working due to restrictions  Dominant Side:         RIGHT       Ambulatory/Rehab Services H2 Model Falls Risk Assessment    Risk Factors:       No Risk Factors Identified Ability to Rise from Chair:       (0)  Ability to rise in a single movement    Falls Prevention Plan:       No modifications necessary   Total: (5 or greater = High Risk): 0    ©2010 [employer] [address]. [facility] Patent #[id number]. Federal Law prohibits the replication, distribution or use without written permission from [facility]     Current Medications:     No current outpatient medications on file. Date Last Reviewed:  12/17/2018   Number of Personal Factors/Comorbidities that affect the Plan of Care: 0: LOW COMPLEXITY   EXAMINATION:   Observation/Orthostatic Postural Assessment:          Standing: no abnormalities noted        Ambulation: no abnormalities noted    Palpation:          Increased tenderness to palpation over R thumb DIP and CMC joints, and trigger points in thenar eminence        Increased tenderness to cross friction palpation of tendons over radial styloid R hand    ROM/Strength  Joint/Movement  Range of Motion- eval ROM - reassess  Date: 12/12/18  Strength- eval Strength - reassess  Date: 12/12/18   Thumb extension R: 45 degrees  L: WFL R: 45 deg (with less pain than previous) 3+/5 3+/5   Thumb flexion R: 10 degrees  L: WFL R: 15 deg (with less pain than previous) 3/5 with pain 3+/5 with pain   Thumb abduction R: 40 degrees  L: WFL R: 45 deg (with less pain than previous) 3+/5 with pain 3+/5 with pain    (in lbs of force)   R: 19, 17, 17  L: 22, 25, 23        Balance:          No deficits noted    Special test:      Finklestein's: positive for de quervains     Body Structures Involved:  1. Bones  2. Joints  3. Muscles Body Functions Affected:  1. Sensory/Pain  2. Neuromusculoskeletal  3. Movement Related Activities and Participation Affected:  1. General Tasks and Demands  2. Mobility  3. Community, Social and [place]   Number of elements (examined above) that affect the Plan of Care: 1-2: LOW COMPLEXITY   CLINICAL PRESENTATION:   Presentation: Stable and uncomplicated: LOW COMPLEXITY   CLINICAL DECISION MAKING:   Outcome Measure:    Tool Used: Disabilities of the Arm, Shoulder and Hand (DASH) Questionnaire - Quick Version  Score: Initial: 42/55  Most Recent: X/55 (Date: -- )   Interpretation of Score: The DASH is designed to measure the activities of daily living in person's with upper extremity dysfunction or pain. Each section is scored on a 1-5 scale, 5 representing the greatest disability. The scores of each section are added together for a total score of 55. Score 11 12-19 20-28 29-37 38-45 46-54 55   Modifier CH CI CJ CK CL CM CN     Medical Necessity:   · Skilled intervention continues to be required due to decreased function. Reason for Services/Other Comments:  · Patient continues to require skilled intervention due to decreased function. Use of outcome tool(s) and clinical judgement create a POC that gives a: Clear prediction of patient's progress: LOW COMPLEXITY            TREATMENT:   (In addition to Assessment/Re-Assessment sessions the following treatments were rendered)  Pre-treatment Symptoms/Complaints:  Pt reports 6/10 pain, says that it is starting to feel a bit better. Pain: Initial:     6/10 Post Session: 6/10     THERAPEUTIC EXERCISE: (25 minutes):  Exercises per grid below to improve mobility and strength.     Date:  11/28 Date:  11/30 Date:  12/5 12/7 12/12 12/17   Activity/Exercise Parameters Parameters Parameters      ube  5 min level 1 5 min level 1 4/4 level 1 4/4 level 1 4/4 level 1   Thumb flexion/extension X 10 X 10 X 15 X 15 X 15 X 15   Thumb abduction X 10 X 10 X 15 X 15 X 15 X 15   Palm up thumb lift  X 10 X 15 X 15 X 15 X 15   Palm up thumb opposition  X 10 X 15 X 15 X 15 X 15   Thenar stretch  10 sec hold x 5 10 sec hold x 5 10 sec hold x 5 10 sec hold x 5 10 sec hold x 5   Stretch for radial side of thumb/wrist  10 sec hold x 5 10 sec hold x 5 5 sec hold x 5 5 sec hold x 5 5 sec hold x 5   Radial deviation  X 10 X 10 X 15, 2# X 15, 2# X 15, 2#   Thumb extension  Rubber band resistance x 10 Rubber band resistance x 15 Rubber band resistance   x 15 Rubber band resistance   x 15 Rubber band resistance  X 15   Thumb flexion   Rubber band resistance x 15 Rubber band resistance x 15 Rubber band resistance   x 15 Rubber band resistance  X 15   Wrist extension  2# x 20 2# x 20 2# x 20 2# x 20 2# x 20   Wrist flexion  2# x 20 2# x 20 2# x 20 2# x 20 2# x 20   Pronation/supination      2# x 20       MANUAL THERAPY: (15 minutes): To increase motion and reduce pain  - distraction at R [facility] joint  - PROM of R CMC joint  - MFR manually to thenar eminence and wrist radial deviator muscles    MODALITIES: ( 15 minutes): for pain reduction  - electrical stimulation using H-wave, high intensity with one channel across R CMC joint, intensity adjusted to pt tolerance    ULTRASOUND (10 minutes): for pain reduction and increased movement  - 1 MHz,, 100% duty cycle, 0.8 W/cm2     Treatment/Session Assessment:    · Response to Treatment:  Pt tolerates PROM without as much pain as previously, movements generally less acutely sensitive as they were before. · Compliance with Program/Exercises: Will assess as treatment progresses. · Recommendations/Intent for next treatment session: \"Next visit will focus on advancements to more challenging activities\". Total Treatment Duration:  PT Patient Time In/Time Out  Time In: 1000  Time Out: 1110    No future appointments.     Carli Clark, PT, DPT

## 2018-12-21 ENCOUNTER — APPOINTMENT (OUTPATIENT)
Dept: PHYSICAL THERAPY | Age: 53
End: 2018-12-21
Payer: COMMERCIAL

## 2018-12-31 ENCOUNTER — HOSPITAL ENCOUNTER (OUTPATIENT)
Dept: PHYSICAL THERAPY | Age: 53
Discharge: HOME | End: 2018-12-31
Payer: COMMERCIAL

## 2018-12-31 PROCEDURE — 97014 ELECTRIC STIMULATION THERAPY: CPT

## 2018-12-31 PROCEDURE — 97110 THERAPEUTIC EXERCISES: CPT

## 2018-12-31 PROCEDURE — 97035 APP MDLTY 1+ULTRASOUND EA 15: CPT

## 2018-12-31 NOTE — PROGRESS NOTES
Sharron Hankins  : 1965  Payor: Venus Ding / Plan: Deidra Cost / Product Type: Workers Comp /  [address]  at [facility]  [address], [address], [address]  [phone]   [phone]        OUTPATIENT PHYSICAL THERAPY:Daily Note 2018   ICD-10: Treatment Diagnosis: Pain in right hand (M79.641),   Precautions/Allergies:   Patient has no allergy information on record. MD Orders: evaluate and treat. Work restrictions: limit use of right thumb, wear thumb spica MEDICAL/REFERRING DIAGNOSIS:  Pain in right hand [M79.641]  Radial styloid tenosynovitis (de quervain) [M65.4]   DATE OF ONSET: summer 2018  REFERRING PHYSICIAN: Pamela Haile MD  RETURN PHYSICIAN APPOINTMENT: --     INITIAL ASSESSMENT:  Ms. Charlene Brunner has attended 5 physical therapy treatments for thumb and wrist pain and is approved/scheduled for one more. We have focused on increasing motion at the [facility] joint and calming down symptoms. We have made progress in ROM tolerated and improvement in pain levels during the movements. Sharron Hankins still demonstrates significant limitations in thumb/CMC joint movement and associated strength and would continue to benefit from 12 more appointments for skilled physical therapy. PROBLEM LIST (Impacting functional limitations):  1. Decreased Strength  2. Decreased ADL/Functional Activities  3. Increased Pain  4. Decreased Flexibility/Joint Mobility  5. Decreased [place] with Home Exercise Program INTERVENTIONS PLANNED:  1. Home Exercise Program (HEP)  2. Manual Therapy including joint and soft tissue manipulation and mobilization, and dry needling  3. Therapeutic Exercise/Strengthening  4. Modalities including heat/cold application and electrical stimulation   TREATMENT PLAN:  Effective Dates: 18 TO 19.   Frequency/Duration: 2 times a week for 3 weeks  GOALS: (Goals have been discussed and agreed upon with patient.)  Short-Term Functional Goals: Time Frame: 3 weeks  1. Pt will be independent with > or = 2 exercises in HEP. met  2. Pt will demonstrate > 5 degrees improvement in thumb movement all directions. Partially met  3. Pt will report < or = 37 on DASH. ongoiing  Discharge Goals: Time Frame: 6 weeks  1. Pt will be independent with > or = 4 exercises in HEP. ongoing  2. Pt will report < or = 28 on DASH. ongoing  3. Pt will demonstrate functional R thumb AROM and strength. ongoing  Rehabilitation Potential For Stated Goals: Good  Regarding Darleen Cornejo's therapy, I certify that the treatment plan above will be carried out by a therapist or under their direction. Thank you for this referral,  Wallace Menendez, PT, DPT       Referring Physician Signature: Lori Alberts MD          Date                      The information in this section was collected on 11/28/18 (except where otherwise noted). HISTORY:   History of Present Injury/Illness (Reason for Referral):  Pt has R thumb, wrist pain. 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 Low levels of pain for about 1 year, but the pain became bad this summer and she went open workers comp claim in early fall. Has been through PT for this previously this fall, resulted in no change. Radiographs found potential old broken bones in R CMC region and tendonitis. Past Medical History/Comorbidities:   Ms. Rudi Sherman  has no past medical history on file. Ms. Rudi Sherman  has no past surgical history on file.   Social History/Living Environment:     house  Prior Level of Function/Work/Activity:  Not currently working due to restrictions  Dominant Side:         RIGHT       Ambulatory/Rehab Services H2 Model Falls Risk Assessment    Risk Factors:       No Risk Factors Identified Ability to Rise from Chair:       (0)  Ability to rise in a single movement    Falls Prevention Plan:       No modifications necessary   Total: (5 or greater = High Risk): 0    ©2010 [employer] [address]. [facility] Patent #[id number]. Federal Law prohibits the replication, distribution or use without written permission from [facility] [employer]     Current Medications:     No current outpatient medications on file. Date Last Reviewed:  12/31/2018   Number of Personal Factors/Comorbidities that affect the Plan of Care: 0: LOW COMPLEXITY   EXAMINATION:   Observation/Orthostatic Postural Assessment:          Standing: no abnormalities noted        Ambulation: no abnormalities noted    Palpation:          Increased tenderness to palpation over R thumb DIP and CMC joints, and trigger points in thenar eminence        Increased tenderness to cross friction palpation of tendons over radial styloid R hand    ROM/Strength  Joint/Movement  Range of Motion- eval ROM - reassess  Date: 12/12/18  Strength- eval Strength - reassess  Date: 12/12/18   Thumb extension R: 45 degrees  L: WFL R: 45 deg (with less pain than previous) 3+/5 3+/5   Thumb flexion R: 10 degrees  L: WFL R: 15 deg (with less pain than previous) 3/5 with pain 3+/5 with pain   Thumb abduction R: 40 degrees  L: WFL R: 45 deg (with less pain than previous) 3+/5 with pain 3+/5 with pain    (in lbs of force)   R: 19, 17, 17  L: 22, 25, 23        Balance:          No deficits noted    Special test:      Finklestein's: positive for de quervains     Body Structures Involved:  1. Bones  2. Joints  3. Muscles Body Functions Affected:  1. Sensory/Pain  2. Neuromusculoskeletal  3. Movement Related Activities and Participation Affected:  1. General Tasks and Demands  2. Mobility  3. Community, Social and [place]   Number of elements (examined above) that affect the Plan of Care: 1-2: LOW COMPLEXITY   CLINICAL PRESENTATION:   Presentation: Stable and uncomplicated: LOW COMPLEXITY   CLINICAL DECISION MAKING:   Outcome Measure:    Tool Used: Disabilities of the Arm, Shoulder and Hand (DASH) Questionnaire - Quick Version  Score: Initial: 42/55  Most Recent: X/55 (Date: -- )   Interpretation of Score: The DASH is designed to measure the activities of daily living in person's with upper extremity dysfunction or pain. Each section is scored on a 1-5 scale, 5 representing the greatest disability. The scores of each section are added together for a total score of 55. Score 11 12-19 20-28 29-37 38-45 46-54 55   Modifier CH CI CJ CK CL CM CN     Medical Necessity:   · Skilled intervention continues to be required due to decreased function. Reason for Services/Other Comments:  · Patient continues to require skilled intervention due to decreased function. Use of outcome tool(s) and clinical judgement create a POC that gives a: Clear prediction of patient's progress: LOW COMPLEXITY            TREATMENT:   (In addition to Assessment/Re-Assessment sessions the following treatments were rendered)  Pre-treatment Symptoms/Complaints:  Pt reports 4/10 pain at rest, but more when moving it and using it  Pain: Initial:     4/10 Post Session: 5/10     THERAPEUTIC EXERCISE: (25 minutes):  Exercises per grid below to improve mobility and strength.     Date:  11/28 Date:  11/30 Date:  12/5 12/7 12/12 12/17 12/31   Activity/Exercise Parameters Parameters Parameters       ube  5 min level 1 5 min level 1 4/4 level 1 4/4 level 1 4/4 level 1 4/4 level 1   Thumb flexion/extension X 10 X 10 X 15 X 15 X 15 X 15 X 15   Thumb abduction X 10 X 10 X 15 X 15 X 15 X 15 X 15   Palm up thumb lift  X 10 X 15 X 15 X 15 X 15 X 15   Palm up thumb opposition  X 10 X 15 X 15 X 15 X 15 X 15   Thenar stretch  10 sec hold x 5 10 sec hold x 5 10 sec hold x 5 10 sec hold x 5 10 sec hold x 5 10 sec hold x 5   Stretch for radial side of thumb/wrist  10 sec hold x 5 10 sec hold x 5 5 sec hold x 5 5 sec hold x 5 5 sec hold x 5 5 sec hold x 5   Radial deviation  X 10 X 10 X 15, 2# X 15, 2# X 15, 2# X 15, 2#   Thumb extension  Rubber band resistance x 10 Rubber band resistance x 15 Rubber band resistance   x 15 Rubber band resistance   x 15 Rubber band resistance  X 15 Rubber band resistance x 15   Thumb flexion   Rubber band resistance x 15 Rubber band resistance x 15 Rubber band resistance   x 15 Rubber band resistance  X 15 Rubber band resistance x 15   Wrist extension  2# x 20 2# x 20 2# x 20 2# x 20 2# x 20 2# x 20   Wrist flexion  2# x 20 2# x 20 2# x 20 2# x 20 2# x 20 2# x 20   Pronation/supination      2# x 20 2# x 20       MANUAL THERAPY: (0 minutes): To increase motion and reduce pain  - distraction at R [facility] joint  - PROM of R CMC joint  - MFR manually to thenar eminence and wrist radial deviator muscles    MODALITIES: ( 15 minutes): for pain reduction  - electrical stimulation using H-wave, high intensity with one channel across R CMC joint, intensity adjusted to pt tolerance    ULTRASOUND (10 minutes): for pain reduction and increased movement  - 1 MHz,, 50% duty cycle, 0.8 W/cm2     Treatment/Session Assessment:    · Response to Treatment:  Pt continues to tolerate movement without as much sensitivity. Increased pain with exercises and movement. Resting pain levels are coming down however. · Compliance with Program/Exercises: Will assess as treatment progresses. · Recommendations/Intent for next treatment session: \"Next visit will focus on advancements to more challenging activities\".   Total Treatment Duration:  PT Patient Time In/Time Out  Time In: 1115  Time Out: 1210    Future Appointments   Date Time Provider Jamaal Russell   1/2/2019 10:45 AM [facility]   1/4/2019 10:45 AM [facility]   1/7/2019  1:00 PM [facility]erino Go PT[facility]   1/9/2019  1:30 PM [facility]erino Go PT[facility]   1/11/2019 10:45 AM [facility]den [facility]       Juanis Burgos, PT, DPT

## 2019-01-02 ENCOUNTER — HOSPITAL ENCOUNTER (OUTPATIENT)
Dept: PHYSICAL THERAPY | Age: 54
Discharge: HOME | End: 2019-01-02
Payer: COMMERCIAL

## 2019-01-02 PROCEDURE — 97140 MANUAL THERAPY 1/> REGIONS: CPT

## 2019-01-02 PROCEDURE — 97014 ELECTRIC STIMULATION THERAPY: CPT

## 2019-01-02 PROCEDURE — 97110 THERAPEUTIC EXERCISES: CPT

## 2019-01-02 PROCEDURE — 97035 APP MDLTY 1+ULTRASOUND EA 15: CPT

## 2019-01-02 NOTE — PROGRESS NOTES
Jayesh Leal  : 1965  Payor: Radha Ko / Plan: Ravindra [place] / Product Type: Workers Comp /  [address]  at [address]  [address], [address], [address]  [phone]   [phone]        OUTPATIENT PHYSICAL [address] Note 2019   ICD-10: Treatment Diagnosis: Pain in right hand (M79.641),   Precautions/Allergies:   Patient has no allergy information on record. MD Orders: evaluate and treat. Work restrictions: limit use of right thumb, wear thumb spica MEDICAL/REFERRING DIAGNOSIS:  Pain in right hand [M79.641]  Radial styloid tenosynovitis (de quervain) [M65.4]   DATE OF ONSET: summer 2018  REFERRING PHYSICIAN: Elier Kilgore MD  RETURN PHYSICIAN APPOINTMENT: --     INITIAL ASSESSMENT:  Ms. Kael Mittal has attended 5 physical therapy treatments for thumb and wrist pain and is approved/scheduled for one more. We have focused on increasing motion at the [facility] joint and calming down symptoms. We have made progress in ROM tolerated and improvement in pain levels during the movements. Jayesh Leal still demonstrates significant limitations in thumb/CMC joint movement and associated strength and would continue to benefit from 12 more appointments for skilled physical therapy. PROBLEM LIST (Impacting functional limitations):  1. Decreased Strength  2. Decreased ADL/Functional Activities  3. Increased Pain  4. Decreased Flexibility/Joint Mobility  5. Decreased [place] with Home Exercise Program INTERVENTIONS PLANNED:  1. Home Exercise Program (HEP)  2. Manual Therapy including joint and soft tissue manipulation and mobilization, and dry needling  3. Therapeutic Exercise/Strengthening  4. Modalities including heat/cold application and electrical stimulation   TREATMENT PLAN:  Effective Dates: 18 TO 19.   Frequency/Duration: 2 times a week for 3 weeks  GOALS: (Goals have been discussed and agreed upon with patient.)  Short-Term Functional Goals: Time Frame: 3 weeks  1. Pt will be independent with > or = 2 exercises in HEP. met  2. Pt will demonstrate > 5 degrees improvement in thumb movement all directions. Partially met  3. Pt will report < or = 37 on DASH. ongoiing  Discharge Goals: Time Frame: 6 weeks  1. Pt will be independent with > or = 4 exercises in HEP. ongoing  2. Pt will report < or = 28 on DASH. ongoing  3. Pt will demonstrate functional R thumb AROM and strength. ongoing  Rehabilitation Potential For Stated Goals: Good                The information in this section was collected on 11/28/18 (except where otherwise noted). HISTORY:   History of Present Injury/Illness (Reason for Referral):  Pt has R thumb, wrist pain. 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 Low levels of pain for about 1 year, but the pain became bad this summer and she went open workers comp claim in early fall. Has been through PT for this previously this fall, resulted in no change. Radiographs found potential old broken bones in R CMC region and tendonitis. Past Medical History/Comorbidities:   Ms. Kristina Pedroza  has no past medical history on file. Ms. Kristina Pedroza  has no past surgical history on file. Social History/Living Environment:     house  Prior Level of Function/Work/Activity:  Not currently working due to restrictions  Dominant Side:         RIGHT       Ambulatory/Rehab Services H2 Model Falls Risk Assessment    Risk Factors:       No Risk Factors Identified Ability to Rise from Chair:       (0)  Ability to rise in a single movement    Falls Prevention Plan:       No modifications necessary   Total: (5 or greater = High Risk): 0    ©2010 [facility] of [employer] [address] [place] [address] States Patent #[id number]. Federal Law prohibits the replication, distribution or use without written permission from [facility] [employer]     Current Medications:     No current outpatient medications on file.    Date Last Reviewed: 1/2/2019   Number of Personal Factors/Comorbidities that affect the Plan of Care: 0: LOW COMPLEXITY   EXAMINATION:   Observation/Orthostatic Postural Assessment:          Standing: no abnormalities noted        Ambulation: no abnormalities noted    Palpation:          Increased tenderness to palpation over R thumb DIP and CMC joints, and trigger points in thenar eminence        Increased tenderness to cross friction palpation of tendons over radial styloid R hand    ROM/Strength  Joint/Movement  Range of Motion- eval ROM - reassess  Date: 12/12/18  Strength- eval Strength - reassess  Date: 12/12/18   Thumb extension R: 45 degrees  L: WFL R: 45 deg (with less pain than previous) 3+/5 3+/5   Thumb flexion R: 10 degrees  L: WFL R: 15 deg (with less pain than previous) 3/5 with pain 3+/5 with pain   Thumb abduction R: 40 degrees  L: WFL R: 45 deg (with less pain than previous) 3+/5 with pain 3+/5 with pain    (in lbs of force)   R: 19, 17, 17  L: 22, 25, 23        Balance:          No deficits noted    Special test:      Finklestein's: positive for de quervains     Body Structures Involved:  1. Bones  2. Joints  3. Muscles Body Functions Affected:  1. Sensory/Pain  2. Neuromusculoskeletal  3. Movement Related Activities and Participation Affected:  1. General Tasks and Demands  2. Mobility  3. Community, Social and [place]   Number of elements (examined above) that affect the Plan of Care: 1-2: LOW COMPLEXITY   CLINICAL PRESENTATION:   Presentation: Stable and uncomplicated: LOW COMPLEXITY   CLINICAL DECISION MAKING:   Outcome Measure: Tool Used: Disabilities of the Arm, Shoulder and Hand (DASH) Questionnaire - Quick Version  Score:  Initial: 42/55  Most Recent: X/55 (Date: -- )   Interpretation of Score: The DASH is designed to measure the activities of daily living in person's with upper extremity dysfunction or pain. Each section is scored on a 1-5 scale, 5 representing the greatest disability.   The scores of each section are added together for a total score of 55. Score 11 12-19 20-28 29-37 38-45 46-54 55   Modifier CH CI CJ CK CL CM CN     Medical Necessity:   · Skilled intervention continues to be required due to decreased function. Reason for Services/Other Comments:  · Patient continues to require skilled intervention due to decreased function. Use of outcome tool(s) and clinical judgement create a POC that gives a: Clear prediction of patient's progress: LOW COMPLEXITY            TREATMENT:   (In addition to Assessment/Re-Assessment sessions the following treatments were rendered)  Pre-treatment Symptoms/Complaints:  Pt with multiple questions today about possible referral to hand specialist and extending time out of work. Pain: Initial:     4/10 Post Session: 5/10     THERAPEUTIC EXERCISE: (15 minutes):  Exercises per grid below to improve mobility and strength.     Date:  11/28 Date:  11/30 Date:  12/5 12/7 12/12 12/17 12/31 1/2/19   Activity/Exercise Parameters Parameters Parameters        ube  5 min level 1 5 min level 1 4/4 level 1 4/4 level 1 4/4 level 1 4/4 level 1 5/5 level 1   Thumb flexion/extension X 10 X 10 X 15 X 15 X 15 X 15 X 15 X 15   Thumb abduction X 10 X 10 X 15 X 15 X 15 X 15 X 15 X 15   Palm up thumb lift  X 10 X 15 X 15 X 15 X 15 X 15 X 15   Palm up thumb opposition  X 10 X 15 X 15 X 15 X 15 X 15 X 15   Ball squeeze        X 15   Thenar stretch  10 sec hold x 5 10 sec hold x 5 10 sec hold x 5 10 sec hold x 5 10 sec hold x 5 10 sec hold x 5    Stretch for radial side of thumb/wrist  10 sec hold x 5 10 sec hold x 5 5 sec hold x 5 5 sec hold x 5 5 sec hold x 5 5 sec hold x 5    Radial deviation  X 10 X 10 X 15, 2# X 15, 2# X 15, 2# X 15, 2#    Thumb extension  Rubber band resistance x 10 Rubber band resistance x 15 Rubber band resistance   x 15 Rubber band resistance   x 15 Rubber band resistance  X 15 Rubber band resistance x 15 Manual x 15   Thumb flexion   Rubber band resistance x 15 Rubber band resistance x 15 Rubber band resistance   x 15 Rubber band resistance  X 15 Rubber band resistance x 15 Manual x 15   Wrist extension  2# x 20 2# x 20 2# x 20 2# x 20 2# x 20 2# x 20    Wrist flexion  2# x 20 2# x 20 2# x 20 2# x 20 2# x 20 2# x 20    Pronation/supination      2# x 20 2# x 20 Manual x 15       MANUAL THERAPY: (10 minutes): To increase motion and reduce pain  - distraction at R [facility] joint  - PROM of R CMC joint  - MFR manually to thenar eminence and wrist radial deviator muscles    MODALITIES: ( 15 minutes): for pain reduction  - electrical stimulation using H-wave, high intensity with one channel across R CMC joint, intensity adjusted to pt tolerance    ULTRASOUND (10 minutes): for pain reduction and increased movement  - 1 MHz,, 50% duty cycle, 0.8 W/cm2     Treatment/Session Assessment:    · Response to Treatment:  Pt reported increased pain with manual joint mobs today. · Compliance with Program/Exercises: Will assess as treatment progresses. · Recommendations/Intent for next treatment session: \"Next visit will focus on advancements to more challenging activities\".   Total Treatment Duration:  PT Patient Time In/Time Out  Time In: 0141  Time Out: 1645    Future Appointments   Date Time Provider Jamaal Russell   1/7/2019  1:00 PM [facility]   1/9/2019  1:30 PM iVpul Hayden, PT, [facility]   1/11/2019 10:45 AM [facility]den [facility]       Yanira Rodriguez, PT

## 2019-01-04 ENCOUNTER — APPOINTMENT (OUTPATIENT)
Dept: PHYSICAL THERAPY | Age: 54
End: 2019-01-04
Payer: COMMERCIAL

## 2019-01-07 ENCOUNTER — HOSPITAL ENCOUNTER (OUTPATIENT)
Dept: PHYSICAL THERAPY | Age: 54
Discharge: HOME | End: 2019-01-07
Payer: COMMERCIAL

## 2019-01-07 PROCEDURE — 97110 THERAPEUTIC EXERCISES: CPT

## 2019-01-07 PROCEDURE — 97035 APP MDLTY 1+ULTRASOUND EA 15: CPT

## 2019-01-07 PROCEDURE — 97140 MANUAL THERAPY 1/> REGIONS: CPT

## 2019-01-07 PROCEDURE — 97014 ELECTRIC STIMULATION THERAPY: CPT

## 2019-01-07 NOTE — PROGRESS NOTES
Ruba Leiva  : 1965  Payor: Brenda Silva / Plan: Keon Mullen / Product Type: Workers Comp /  [address] at [address]  [address], [address] [address], [address]  [phone]   [phone]        OUTPATIENT PHYSICAL THERAPY:Daily Note and Recertification    ICD-10: Treatment Diagnosis: Pain in right hand (M79.641),   Precautions/Allergies:   Patient has no allergy information on record. MD Orders: evaluate and treat. Work restrictions: limit use of right thumb, wear thumb spica MEDICAL/REFERRING DIAGNOSIS:  Pain in right hand [M79.641]  Radial styloid tenosynovitis (de quervain) [M65.4]   DATE OF ONSET: summer 2018  REFERRING PHYSICIAN: Henri Sanchez MD  RETURN PHYSICIAN APPOINTMENT: --     INITIAL ASSESSMENT:  Ms. Claudia Hammond has attended 8 physical therapy treatments for thumb and wrist pain and is approved/scheduled for 4 more. We have continued to focus on increasing motion at the [facility] joint and calming down symptoms. We have made progress in ROM tolerated and improvement in pain levels during the movements. She generally reports 4-5/10 pain at rest compared to 7/10 pain at rest upon starting physical therapy. She can tolerate active and passive movements much better, when moving her thumb she generally doesn't wince in pain like she used to. We have started light strengthening activities with 2# weights. Pt did report she was able to move her thumb without pain when the TENS machine was on it. She will likely have issues with forceful gripping/pinching tasks using the right thumb in a work setting. Ruba Leiva still demonstrates limitations in [address] [place] [address] joint movement and associated strength and moderate pain levels will continue to benefit from skilled physical therapy. PROBLEM LIST (Impacting functional limitations):  1. Decreased Strength  2. Decreased ADL/Functional Activities  3. Increased Pain  4.  Decreased Flexibility/Joint Mobility  5. Decreased [place] with Home Exercise Program INTERVENTIONS PLANNED:  1. Home Exercise Program (HEP)  2. Manual Therapy including joint and soft tissue manipulation and mobilization, and dry needling  3. Therapeutic Exercise/Strengthening  4. Modalities including heat/cold application and electrical stimulation   TREATMENT PLAN:  Effective Dates: 1/7/18 to 3/7/18. Frequency/Duration: 2 times a week for 3 weeks  GOALS: (Goals have been discussed and agreed upon with patient.)  Short-Term Functional Goals: Time Frame: 3 weeks  1. Pt will be independent with > or = 2 exercises in HEP. met  2. Pt will demonstrate > 5 degrees improvement in thumb movement all directions. Partially met  3. Pt will report < or = 37 on DASH. ongoiing  Discharge Goals: Time Frame: 6 weeks  1. Pt will be independent with > or = 4 exercises in HEP. ongoing  2. Pt will report < or = 28 on DASH. ongoing  3. Pt will demonstrate functional R thumb AROM and strength. ongoing  Rehabilitation Potential For Stated Goals: Good    Thank you for this referral,  Justice Dugan, PT, DPT     Referring Physician Signature: Anu Richard MD          Date                          The information in this section was collected on 11/28/18 (except where otherwise noted). HISTORY:   History of Present Injury/Illness (Reason for Referral):  Pt has R thumb, wrist pain. 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 Low levels of pain for about 1 year, but the pain became bad this summer and she went open workers comp claim in early fall. Has been through PT for this previously this fall, resulted in no change. Radiographs found potential old broken bones in R CMC region and tendonitis. Past Medical History/Comorbidities:   Ms. Jalyn Yin  has no past medical history on file. Ms. Jalyn Yin  has no past surgical history on file.   Social History/Living Environment:     house  Prior Level of Function/Work/Activity:  Not currently working due to restrictions  Dominant Side:         RIGHT       Ambulatory/Rehab Services H2 Model Falls Risk Assessment    Risk Factors:       No Risk Factors Identified Ability to Rise from Chair:       (0)  Ability to rise in a single movement    Falls Prevention Plan:       No modifications necessary   Total: (5 or greater = High Risk): 0    ©2010 [facility] of [employer]. All Rights Reserved. [employer] Patent #[id number]. Federal Law prohibits the replication, distribution or use without written permission from [facility] [employer]     Current Medications:     No current outpatient medications on file.    Date Last Reviewed:  1/7/2019   Number of Personal Factors/Comorbidities that affect the Plan of Care: 0: LOW COMPLEXITY   EXAMINATION:   Observation/Orthostatic Postural Assessment:          Standing: no abnormalities noted        Ambulation: no abnormalities noted    Palpation:          Increased tenderness to palpation over R thumb DIP and CMC joints, and trigger points in thenar eminence        Increased tenderness to cross friction palpation of tendons over radial styloid R hand    ROM/Strength  Joint/Movement  Range of Motion- eval ROM - reassess  Date: 12/12/18 ROM- reassess  Date: 1/7/18  Strength- eval Strength - reassess  Date: 12/12/18 Strength-reassess  Date: 1/7/10   Thumb extension R: 45 degrees  L: WFL R: 45 deg (with less pain than previous) R: 45 deg, but able to do without wincing in pain 3+/5 3+/5 4-/5 with pain   Thumb flexion R: 10 degrees  L: WFL R: 15 deg (with less pain than previous) R: 20 deg, with less pain 3/5 with pain 3+/5 with pain 4-/5 with pain   Thumb abduction R: 40 degrees  L: WFL R: 45 deg (with less pain than previous) R: 45 deg, with less pain 3+/5 with pain 3+/5 with pain 4-/5 with pain    (in lbs of force)    R: 19, 17, 17  L: 22, 25, 23         Balance:          No deficits noted    Special test:      Finklestein's: positive for de quervains     Body Structures Involved:  1. Bones  2. Joints  3. Muscles Body Functions Affected:  1. Sensory/Pain  2. Neuromusculoskeletal  3. Movement Related Activities and Participation Affected:  1. General Tasks and Demands  2. Mobility  3. Community, Social and [place]   Number of elements (examined above) that affect the Plan of Care: 1-2: LOW COMPLEXITY   CLINICAL PRESENTATION:   Presentation: Stable and uncomplicated: LOW COMPLEXITY   CLINICAL DECISION MAKING:   Outcome Measure: Tool Used: Disabilities of the Arm, Shoulder and Hand (DASH) Questionnaire - Quick Version  Score:  Initial: 42/55  Most Recent: X/55 (Date: -- )   Interpretation of Score: The DASH is designed to measure the activities of daily living in person's with upper extremity dysfunction or pain. Each section is scored on a 1-5 scale, 5 representing the greatest disability. The scores of each section are added together for a total score of 55. Score 11 12-19 20-28 29-37 38-45 46-54 55   Modifier CH CI CJ CK CL CM CN     Medical Necessity:   · Skilled intervention continues to be required due to decreased function. Reason for Services/Other Comments:  · Patient continues to require skilled intervention due to decreased function. Use of outcome tool(s) and clinical judgement create a POC that gives a: Clear prediction of patient's progress: LOW COMPLEXITY            TREATMENT:   (In addition to Assessment/Re-Assessment sessions the following treatments were rendered)  Pre-treatment Symptoms/Complaints:  Pt reported no significant change in symptoms. Pain: Initial:     5/10 Post Session: 5/10     THERAPEUTIC EXERCISE: (25 minutes):  Exercises per grid below to improve mobility and strength.     Date:  12/5 12/7 12/12 12/17 12/31 1/2/19 1/7   Activity/Exercise Parameters         ube 5 min level 1 4/4 level 1 4/4 level 1 4/4 level 1 4/4 level 1 5/5 level 1 4/4 level 1   Thumb flexion/extension X 15 X 15 X 15 X 15 X 15 X 15 X 20   Thumb abduction X 15 X 15 X 15 X 15 X 15 X 15 X 20   Palm up thumb lift X 15 X 15 X 15 X 15 X 15 X 15 X 20   Palm up thumb opposition X 15 X 15 X 15 X 15 X 15 X 15 X 20   Ball squeeze      X 15 X 20   Thenar stretch 10 sec hold x 5 10 sec hold x 5 10 sec hold x 5 10 sec hold x 5 10 sec hold x 5  5 sec hold x 5   Stretch for radial side of thumb/wrist 10 sec hold x 5 5 sec hold x 5 5 sec hold x 5 5 sec hold x 5 5 sec hold x 5  5 sec hold x 5   Radial deviation X 10 X 15, 2# X 15, 2# X 15, 2# X 15, 2#  2 x 20, 2#   Thumb extension Rubber band resistance x 15 Rubber band resistance   x 15 Rubber band resistance   x 15 Rubber band resistance  X 15 Rubber band resistance x 15 Manual x 15 Rubber band resistance x 20   Thumb flexion Rubber band resistance x 15 Rubber band resistance x 15 Rubber band resistance   x 15 Rubber band resistance  X 15 Rubber band resistance x 15 Manual x 15 Rubber band resistance x 20   Wrist extension 2# x 20 2# x 20 2# x 20 2# x 20 2# x 20  2# x 20   Wrist flexion 2# x 20 2# x 20 2# x 20 2# x 20 2# x 20  2# x 20   Pronation/supination    2# x 20 2# x 20 Manual x 15 2# x 20   Towel squeeze       5 sec hold x 10       MANUAL THERAPY: (10 minutes): To increase motion and reduce pain  - distraction at R [facility] joint  - PROM of R CMC joint  - MFR manually to thenar eminence and wrist radial deviator muscles    MODALITIES: ( 15 minutes): for pain reduction  - electrical stimulation using H-wave, high intensity with one channel across R CMC joint, intensity adjusted to pt tolerance    ULTRASOUND (10 minutes): for pain reduction and increased movement  - 1 MHz,, 50% duty cycle, 0.8 W/cm2     Treatment/Session Assessment:    · Response to Treatment:  Pt reported that she can move her thumb without pain when electrical stimulation is on, with off the pain comes back.  She reports increased pain with most all movements, but that the intensity of the pain is less than it has been in the past.   · Compliance with Program/Exercises: Will assess as treatment progresses. · Recommendations/Intent for next treatment session: \"Next visit will focus on advancements to more challenging activities\".   Total Treatment Duration:  PT Patient Time In/Time Out  Time In: 1315  Time Out: 1430    Future Appointments   Date Time Provider Jamaal Russell   1/9/2019  1:30 PM Alexander Kilpatrick, PT, DPT [facility]   1/11/2019 10:45 AM Marion Hayden, PT, DPT [facility]       Abdiaziz Dumont, PT, DPT

## 2019-01-09 ENCOUNTER — HOSPITAL ENCOUNTER (OUTPATIENT)
Dept: PHYSICAL THERAPY | Age: 54
Discharge: HOME | End: 2019-01-09
Payer: COMMERCIAL

## 2019-01-09 PROCEDURE — 97140 MANUAL THERAPY 1/> REGIONS: CPT

## 2019-01-09 PROCEDURE — 97110 THERAPEUTIC EXERCISES: CPT

## 2019-01-09 PROCEDURE — 97014 ELECTRIC STIMULATION THERAPY: CPT

## 2019-01-09 NOTE — PROGRESS NOTES
Christel Osgood  : 1965  Payor: Moises Barnes / Plan: Ronny Manrique / Product Type: Workers Comp /  [address]  at [facility]  [address], [address], [address]  [phone]   [phone]        OUTPATIENT PHYSICAL [address] Note 2019   ICD-10: Treatment Diagnosis: Pain in right hand (M79.641),   Precautions/Allergies:   Patient has no allergy information on record. MD Orders: evaluate and treat. Work restrictions: limit use of right thumb, wear thumb spica MEDICAL/REFERRING DIAGNOSIS:  Pain in right hand [M79.641]  Radial styloid tenosynovitis (de quervain) [M65.4]   DATE OF ONSET: summer 2018  REFERRING PHYSICIAN: Theo Pollock MD  RETURN PHYSICIAN APPOINTMENT: --     INITIAL ASSESSMENT:  Ms. Murphy Torres has attended 8 physical therapy treatments for thumb and wrist pain and is approved/scheduled for 4 more. We have continued to focus on increasing motion at the [facility] joint and calming down symptoms. We have made progress in ROM tolerated and improvement in pain levels during the movements. She generally reports 4-5/10 pain at rest compared to 7/10 pain at rest upon starting physical therapy. She can tolerate active and passive movements much better, when moving her thumb she generally doesn't wince in pain like she used to. We have started light strengthening activities with 2# weights. Pt did report she was able to move her thumb without pain when the TENS machine was on it. She will likely have issues with forceful gripping/pinching tasks using the right thumb in a work setting. Christel Osgood still demonstrates limitations in [address] [place] [address] joint movement and associated strength and moderate pain levels will continue to benefit from skilled physical therapy. PROBLEM LIST (Impacting functional limitations):  1. Decreased Strength  2. Decreased ADL/Functional Activities  3. Increased Pain  4. Decreased Flexibility/Joint Mobility  5.  Decreased [place] with Home Exercise Program INTERVENTIONS PLANNED:  1. Home Exercise Program (HEP)  2. Manual Therapy including joint and soft tissue manipulation and mobilization, and dry needling  3. Therapeutic Exercise/Strengthening  4. Modalities including heat/cold application and electrical stimulation   TREATMENT PLAN:  Effective Dates: 1/7/18 to 3/7/18. Frequency/Duration: 2 times a week for 3 weeks  GOALS: (Goals have been discussed and agreed upon with patient.)  Short-Term Functional Goals: Time Frame: 3 weeks  1. Pt will be independent with > or = 2 exercises in HEP. met  2. Pt will demonstrate > 5 degrees improvement in thumb movement all directions. Partially met  3. Pt will report < or = 37 on DASH. ongoiing  Discharge Goals: Time Frame: 6 weeks  1. Pt will be independent with > or = 4 exercises in HEP. ongoing  2. Pt will report < or = 28 on DASH. ongoing  3. Pt will demonstrate functional R thumb AROM and strength. ongoing  Rehabilitation Potential For Stated Goals: Good    Thank you for this referral,  Hugo Agee, PT, DPT     Referring Physician Signature: Kaila Damico MD          Date                          The information in this section was collected on 11/28/18 (except where otherwise noted). HISTORY:   History of Present Injury/Illness (Reason for Referral):  Pt has R thumb, wrist pain. 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 Low levels of pain for about 1 year, but the pain became bad this summer and she went open workers comp claim in early fall. Has been through PT for this previously this fall, resulted in no change. Radiographs found potential old broken bones in R CMC region and tendonitis. Past Medical History/Comorbidities:   Ms. Sol Muñoz  has no past medical history on file. Ms. Sol Muñoz  has no past surgical history on file.   Social History/Living Environment:     house  Prior Level of Function/Work/Activity:  Not currently working due to restrictions  Dominant Side:         RIGHT       Ambulatory/Rehab Services H2 Model Falls Risk Assessment    Risk Factors:       No Risk Factors Identified Ability to Rise from Chair:       (0)  Ability to rise in a single movement    Falls Prevention Plan:       No modifications necessary   Total: (5 or greater = High Risk): 0    ©2010 [facility] [employer]. All Rights Reserved. [facility] [employer] Patent #[id number]. Federal Law prohibits the replication, distribution or use without written permission from [facility] [employer]     Current Medications:     No current outpatient medications on file.    Date Last Reviewed:  1/9/2019   Number of Personal Factors/Comorbidities that affect the Plan of Care: 0: LOW COMPLEXITY   EXAMINATION:   Observation/Orthostatic Postural Assessment:          Standing: no abnormalities noted        Ambulation: no abnormalities noted    Palpation:          Increased tenderness to palpation over R thumb DIP and CMC joints, and trigger points in thenar eminence        Increased tenderness to cross friction palpation of tendons over radial styloid R hand    ROM/Strength  Joint/Movement  Range of Motion- eval ROM - reassess  Date: 12/12/18 ROM- reassess  Date: 1/7/18  Strength- eval Strength - reassess  Date: 12/12/18 Strength-reassess  Date: 1/7/10   Thumb extension R: 45 degrees  L: WFL R: 45 deg (with less pain than previous) R: 45 deg, but able to do without wincing in pain 3+/5 3+/5 4-/5 with pain   Thumb flexion R: 10 degrees  L: WFL R: 15 deg (with less pain than previous) R: 20 deg, with less pain 3/5 with pain 3+/5 with pain 4-/5 with pain   Thumb abduction R: 40 degrees  L: WFL R: 45 deg (with less pain than previous) R: 45 deg, with less pain 3+/5 with pain 3+/5 with pain 4-/5 with pain    (in lbs of force)    R: 19, 17, 17  L: 22, 25, 23         Balance:          No deficits noted    Special test:      Finklestein's: positive for de quervains     Body Structures Involved:  1. Bones  2. Joints  3. Muscles Body Functions Affected:  1. Sensory/Pain  2. Neuromusculoskeletal  3. Movement Related Activities and Participation Affected:  1. General Tasks and Demands  2. Mobility  3. Community, Social and [place]   Number of elements (examined above) that affect the Plan of Care: 1-2: LOW COMPLEXITY   CLINICAL PRESENTATION:   Presentation: Stable and uncomplicated: LOW COMPLEXITY   CLINICAL DECISION MAKING:   Outcome Measure: Tool Used: Disabilities of the Arm, Shoulder and Hand (DASH) Questionnaire - Quick Version  Score:  Initial: 42/55  Most Recent: X/55 (Date: -- )   Interpretation of Score: The DASH is designed to measure the activities of daily living in person's with upper extremity dysfunction or pain. Each section is scored on a 1-5 scale, 5 representing the greatest disability. The scores of each section are added together for a total score of 55. Score 11 12-19 20-28 29-37 38-45 46-54 55   Modifier CH CI CJ CK CL CM CN     Medical Necessity:   · Skilled intervention continues to be required due to decreased function. Reason for Services/Other Comments:  · Patient continues to require skilled intervention due to decreased function. Use of outcome tool(s) and clinical judgement create a POC that gives a: Clear prediction of patient's progress: LOW COMPLEXITY            TREATMENT:   (In addition to Assessment/Re-Assessment sessions the following treatments were rendered)  Pre-treatment Symptoms/Complaints:  Pt reports that the resting pain and pain intensity during movement has been coming down significantly. Pt was asking about returning to work, reporting that she is ready to get back to work. Pain: Initial:     4/10 Post Session: 4/10     THERAPEUTIC EXERCISE: (30 minutes):  Exercises per grid below to improve mobility and strength.     12/12 12/17 12/31 1/2/19 1/7 1/9   Activity/Exercise         ube 4/4 level 1 4/4 level 1 4/4 level 1 5/5 level 1 4/4 level 1 4/4 level 1   Thumb flexion/extension X 15 X 15 X 15 X 15 X 20 X 20   Thumb abduction X 15 X 15 X 15 X 15 X 20 X 20   Palm up thumb lift X 15 X 15 X 15 X 15 X 20    Palm up thumb opposition X 15 X 15 X 15 X 15 X 20 X 20   Ball squeeze    X 15 X 20 X 20   Thenar stretch 10 sec hold x 5 10 sec hold x 5 10 sec hold x 5  5 sec hold x 5    Stretch for radial side of thumb/wrist 5 sec hold x 5 5 sec hold x 5 5 sec hold x 5  5 sec hold x 5    Radial deviation X 15, 2# X 15, 2# X 15, 2#  2 x 20, 2# 2 x 10, 2#   Thumb extension Rubber band resistance   x 15 Rubber band resistance  X 15 Rubber band resistance x 15 Manual x 15 Rubber band resistance x 20 Rubber band resistance x 20   Thumb flexion Rubber band resistance   x 15 Rubber band resistance  X 15 Rubber band resistance x 15 Manual x 15 Rubber band resistance x 20 Rubber band resistance x 20   Wrist extension 2# x 20 2# x 20 2# x 20  2# x 20 2# x 20   Wrist flexion 2# x 20 2# x 20 2# x 20  2# x 20 2# x 20   Pronation/supination  2# x 20 2# x 20 Manual x 15 2# x 20 2# x 20   Towel squeeze     5 sec hold x 10 5 sec hold x 10   theraroll flexion/extension      X 20       MANUAL THERAPY: (0 minutes): To increase motion and reduce pain  - distraction at R [address] joint  - PROM of R CMC joint  - MFR manually to thenar eminence and wrist radial deviator muscles    MODALITIES: ( 15 minutes): for pain reduction  - electrical stimulation using H-wave, high intensity with one channel across R CMC joint, intensity adjusted to pt tolerance    ULTRASOUND (10 minutes): for pain reduction and increased movement  - 1 MHz,, 50% duty cycle, 0.8 W/cm2     Treatment/Session Assessment:    · Response to Treatment:  Pt continues to improve in pain and tolerating more activities. · Compliance with Program/Exercises: Will assess as treatment progresses. · Recommendations/Intent for next treatment session: \"Next visit will focus on advancements to more challenging activities\".   Total Treatment Duration:  PT Patient Time In/Time Out  Time In: 1330  Time Out: 1430    Future Appointments   Date Time Provider Jamaal Russell   1/11/2019 10:45 AM Lindy Hayden, PT, DPT [facility]       Francisco Ferro, PT, DPT

## 2019-01-11 ENCOUNTER — HOSPITAL ENCOUNTER (OUTPATIENT)
Dept: PHYSICAL THERAPY | Age: 54
Discharge: HOME | End: 2019-01-11
Payer: COMMERCIAL

## 2019-01-11 PROCEDURE — 97140 MANUAL THERAPY 1/> REGIONS: CPT

## 2019-01-11 PROCEDURE — 97014 ELECTRIC STIMULATION THERAPY: CPT

## 2019-01-11 PROCEDURE — 97035 APP MDLTY 1+ULTRASOUND EA 15: CPT

## 2019-01-11 PROCEDURE — 97110 THERAPEUTIC EXERCISES: CPT

## 2019-01-11 NOTE — PROGRESS NOTES
Asha Sterling  : 1965  Payor: Duanesia Volodymyr / Plan: Augie Kanner / Product Type: Workers Comp /  [address]  at [address]  [address], [address]  [phone]   [phone]        OUTPATIENT PHYSICAL [address] Note 2019   ICD-10: Treatment Diagnosis: Pain in right hand (M79.641),   Precautions/Allergies:   Patient has no allergy information on record. MD Orders: evaluate and treat. Work restrictions: limit use of right thumb, wear thumb spica MEDICAL/REFERRING DIAGNOSIS:  Pain in right hand [M79.641]  Radial styloid tenosynovitis (de quervain) [M65.4]   DATE OF ONSET: summer 2018  REFERRING PHYSICIAN: Mario Patel MD  RETURN PHYSICIAN APPOINTMENT: --     INITIAL ASSESSMENT:  Ms. Luanne Martinez has attended 8 physical therapy treatments for thumb and wrist pain and is approved/scheduled for 4 more. We have continued to focus on increasing motion at the [facility] joint and calming down symptoms. We have made progress in ROM tolerated and improvement in pain levels during the movements. She generally reports 4-5/10 pain at rest compared to 7/10 pain at rest upon starting physical therapy. She can tolerate active and passive movements much better, when moving her thumb she generally doesn't wince in pain like she used to. We have started light strengthening activities with 2# weights. Pt did report she was able to move her thumb without pain when the TENS machine was on it. She will likely have issues with forceful gripping/pinching tasks using the right thumb in a work setting. Asha Sterling still demonstrates limitations in [address] [place] [address] joint movement and associated strength and moderate pain levels will continue to benefit from skilled physical therapy. PROBLEM LIST (Impacting functional limitations):  1. Decreased Strength  2. Decreased ADL/Functional Activities  3. Increased Pain  4. Decreased Flexibility/Joint Mobility  5.  Decreased [place] with Home Exercise Program INTERVENTIONS PLANNED:  1. Home Exercise Program (HEP)  2. Manual Therapy including joint and soft tissue manipulation and mobilization, and dry needling  3. Therapeutic Exercise/Strengthening  4. Modalities including heat/cold application and electrical stimulation   TREATMENT PLAN:  Effective Dates: 1/7/18 to 3/7/18. Frequency/Duration: 2 times a week for 3 weeks  GOALS: (Goals have been discussed and agreed upon with patient.)  Short-Term Functional Goals: Time Frame: 3 weeks  1. Pt will be independent with > or = 2 exercises in HEP. met  2. Pt will demonstrate > 5 degrees improvement in thumb movement all directions. Partially met  3. Pt will report < or = 37 on DASH. ongoiing  Discharge Goals: Time Frame: 6 weeks  1. Pt will be independent with > or = 4 exercises in HEP. ongoing  2. Pt will report < or = 28 on DASH. ongoing  3. Pt will demonstrate functional R thumb AROM and strength. ongoing  Rehabilitation Potential For Stated Goals: Good    Thank you for this referral,  Hugo Agee, PT, DPT     Referring Physician Signature: Kaila Damico MD          Date                          The information in this section was collected on 11/28/18 (except where otherwise noted). HISTORY:   History of Present Injury/Illness (Reason for Referral):  Pt has R thumb, wrist pain. 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 Low levels of pain for about 1 year, but the pain became bad this summer and she went open workers comp claim in early fall. Has been through PT for this previously this fall, resulted in no change. Radiographs found potential old broken bones in R CMC region and tendonitis. Past Medical History/Comorbidities:   Ms. Sol Muñoz  has no past medical history on file. Ms. Sol Muñoz  has no past surgical history on file.   Social History/Living Environment:     house  Prior Level of Function/Work/Activity:  Not currently working due to restrictions  Dominant Side:         RIGHT       Ambulatory/Rehab Services H2 Model Falls Risk Assessment    Risk Factors:       No Risk Factors Identified Ability to Rise from Chair:       (0)  Ability to rise in a single movement    Falls Prevention Plan:       No modifications necessary   Total: (5 or greater = High Risk): 0    ©2010 [facility] [employer]. All Rights Reserved. [facility] [employer] Patent #[id number]. Federal Law prohibits the replication, distribution or use without written permission from [facility] [employer]     Current Medications:     No current outpatient medications on file.    Date Last Reviewed:  1/11/2019   Number of Personal Factors/Comorbidities that affect the Plan of Care: 0: LOW COMPLEXITY   EXAMINATION:   Observation/Orthostatic Postural Assessment:          Standing: no abnormalities noted        Ambulation: no abnormalities noted    Palpation:          Increased tenderness to palpation over R thumb DIP and CMC joints, and trigger points in thenar eminence        Increased tenderness to cross friction palpation of tendons over radial styloid R hand    ROM/Strength  Joint/Movement  Range of Motion- eval ROM - reassess  Date: 12/12/18 ROM- reassess  Date: 1/7/18  Strength- eval Strength - reassess  Date: 12/12/18 Strength-reassess  Date: 1/7/10   Thumb extension R: 45 degrees  L: WFL R: 45 deg (with less pain than previous) R: 45 deg, but able to do without wincing in pain 3+/5 3+/5 4-/5 with pain   Thumb flexion R: 10 degrees  L: WFL R: 15 deg (with less pain than previous) R: 20 deg, with less pain 3/5 with pain 3+/5 with pain 4-/5 with pain   Thumb abduction R: 40 degrees  L: WFL R: 45 deg (with less pain than previous) R: 45 deg, with less pain 3+/5 with pain 3+/5 with pain 4-/5 with pain    (in lbs of force)    R: 19, 17, 17  L: 22, 25, 23         Balance:          No deficits noted    Special test:      Finklestein's: positive for de quervains     Body Structures Involved:  1. Bones  2. Joints  3. Muscles Body Functions Affected:  1. Sensory/Pain  2. Neuromusculoskeletal  3. Movement Related Activities and Participation Affected:  1. General Tasks and Demands  2. Mobility  3. Community, Social and [place]   Number of elements (examined above) that affect the Plan of Care: 1-2: LOW COMPLEXITY   CLINICAL PRESENTATION:   Presentation: Stable and uncomplicated: LOW COMPLEXITY   CLINICAL DECISION MAKING:   Outcome Measure: Tool Used: Disabilities of the Arm, Shoulder and Hand (DASH) Questionnaire - Quick Version  Score:  Initial: 42/55  Most Recent: X/55 (Date: -- )   Interpretation of Score: The DASH is designed to measure the activities of daily living in person's with upper extremity dysfunction or pain. Each section is scored on a 1-5 scale, 5 representing the greatest disability. The scores of each section are added together for a total score of 55. Score 11 12-19 20-28 29-37 38-45 46-54 55   Modifier CH CI CJ CK CL CM CN     Medical Necessity:   · Skilled intervention continues to be required due to decreased function. Reason for Services/Other Comments:  · Patient continues to require skilled intervention due to decreased function. Use of outcome tool(s) and clinical judgement create a POC that gives a: Clear prediction of patient's progress: LOW COMPLEXITY            TREATMENT:   (In addition to Assessment/Re-Assessment sessions the following treatments were rendered)  Pre-treatment Symptoms/Complaints:  Pt reports no significant change in symptoms. Pain: Initial:     4/10 Post Session: 4/10     THERAPEUTIC EXERCISE: (30 minutes):  Exercises per grid below to improve mobility and strength.     12/12 12/17 12/31 1/2/19 1/7 1/9 1/11   Activity/Exercise          ube 4/4 level 1 4/4 level 1 4/4 level 1 5/5 level 1 4/4 level 1 4/4 level 1 4/4 level 2   Thumb flexion/extension X 15 X 15 X 15 X 15 X 20 X 20 X 20   Thumb abduction X 15 X 15 X 15 X 15 X 20 X 20 X 20 Palm up thumb lift X 15 X 15 X 15 X 15 X 20  X 20   Palm up thumb opposition X 15 X 15 X 15 X 15 X 20 X 20 X 20   Ball squeeze    X 15 X 20 X 20 X 20   Thenar stretch 10 sec hold x 5 10 sec hold x 5 10 sec hold x 5  5 sec hold x 5  5 sec hold x 5   Stretch for radial side of thumb/wrist 5 sec hold x 5 5 sec hold x 5 5 sec hold x 5  5 sec hold x 5  5 sec hold x 5   Radial deviation X 15, 2# X 15, 2# X 15, 2#  2 x 20, 2# 2 x 10, 2# 2 x 10, 2#   Thumb extension Rubber band resistance   x 15 Rubber band resistance  X 15 Rubber band resistance x 15 Manual x 15 Rubber band resistance x 20 Rubber band resistance x 20 Rubber band resistance x 20   Thumb flexion Rubber band resistance   x 15 Rubber band resistance  X 15 Rubber band resistance x 15 Manual x 15 Rubber band resistance x 20 Rubber band resistance x 20 Rubber band resistance x 20   Wrist extension 2# x 20 2# x 20 2# x 20  2# x 20 2# x 20 2# x 20   Wrist flexion 2# x 20 2# x 20 2# x 20  2# x 20 2# x 20 2# x 20   Pronation/supination  2# x 20 2# x 20 Manual x 15 2# x 20 2# x 20 2# x 20   Towel squeeze     5 sec hold x 10 5 sec hold x 10 5 sec hold x 10   theraroll flexion/extension      X 20 X 20       MANUAL THERAPY: (10 minutes): To increase motion and reduce pain  - distraction at R [facility] joint  - PROM of R CMC joint  - MFR manually to thenar eminence and wrist radial deviator muscles    MODALITIES: ( 15 minutes): for pain reduction  - electrical stimulation using H-wave, high intensity with one channel across R CMC joint, intensity adjusted to pt tolerance    ULTRASOUND (10 minutes): for pain reduction and increased movement  - 1 MHz,, 50% duty cycle, 0.8 W/cm2     Treatment/Session Assessment:    · Response to Treatment:  Pt continues to improve in pain and tolerating more activities. No further therapy appointments approved. · Compliance with Program/Exercises: Will assess as treatment progresses. · Recommendations/Intent for next treatment session:  \"Next visit will focus on advancements to more challenging activities\". Total Treatment Duration:  PT Patient Time In/Time Out  Time In: 1045  Time Out: 1155    No future appointments.     Arturo Simpson, PT, DPT

## 2019-01-30 NOTE — PROGRESS NOTES
Isabela Frame  : 1965  Payor: Basilio Soliz / Plan: Florance Gina / Product Type: Workers Comp /  [address]  at [facility], [address], [address]  [phone]   [phone]        [address] [place] [address] 2019   ICD-10: Treatment Diagnosis: Pain in right hand (M79.641),   Precautions/Allergies:   Patient has no allergy information on record. MD Orders: evaluate and treat. Work restrictions: limit use of right thumb, wear thumb spica MEDICAL/REFERRING DIAGNOSIS:  Pain in right hand [M79.641]  Radial styloid tenosynovitis (de quervain) [M65.4]   DATE OF ONSET: summer 2018  REFERRING PHYSICIAN: Janelle Ordoñez MD  RETURN PHYSICIAN APPOINTMENT: --     INITIAL ASSESSMENT:  Ms. Lorenzo Curiel attended 10 physical therapy treatments and reported a slow but steady improvement. She reported that she had been referred to a hand specialist. We haven't heard from her since her last appointment on 19, as such this plan of care is discharged. TREATMENT PLAN:  Discharge.      Thank you for this referral,  Charlette Doss, PT, DPT

## 2020-12-02 ENCOUNTER — HOSPITAL ENCOUNTER (OUTPATIENT)
Dept: PHYSICAL THERAPY | Age: 55
Discharge: HOME | End: 2020-12-02
Payer: COMMERCIAL

## 2020-12-02 PROCEDURE — 97166 OT EVAL MOD COMPLEX 45 MIN: CPT

## 2020-12-02 NOTE — THERAPY EVALUATION
Kailee Ramos  : 1965  Primary: Abena Francorisk  Secondary:  [address] at [address]  [address], [address]   [phone]   [phone]        OUTPATIENT OCCUPATIONAL THERAPY:Initial Assessment 2020   ICD-10: Treatment Diagnosis: M25.60, Joint stiffness  Unilateral primary OA of first carpometacarpal joint, right hand M18.11  Primary OA, right wrist, M19.031  Precautions/Allergies:   Patient has no allergy information on record. TREATMENT PLAN:  Effective Dates: 2020 TO 2021 (60 days). Frequency/Duration: 1 time a week for 60 Day(s) MEDICAL/REFERRING DIAGNOSIS:  Unilateral primary osteoarthritis of first carpometacarpal joint, right hand [M18.11]  Primary osteoarthritis, right wrist [M19.031]   DATE OF ONSET: 2020  REFERRING PHYSICIAN: Marysol Ott MD MD Orders: Evaluate and treat  Return MD Appointment: 2020     INITIAL ASSESSMENT:  Ms. Hien Pompa presents s/p 10 days thumb surgery for [facility] [place]. 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 She had her surgical sutures removed today and presents with thumb and wrist immobilized in a forearm based thumb spica splint with IP free. She is here for rehabilitation of her hand, Post surgical protocol provided from physician. Will see patient 1x a week per protocol. PROBLEM LIST (Impacting functional limitations):  1. Decreased Strength  2. Decreased Flexibility/Joint Mobility  3. Edema/Girth  4. Decreased Knowledge of Precautions  5. Decreased [place] with Home Exercise Program INTERVENTIONS PLANNED: (Treatment may consist of any combination of the following)  1. Manual therapy training  2. Modalities  3. Splinting  4. Therapeutic activity  5.  Therapeutic exercise     GOALS: (Goals have been discussed and agreed upon with patient.)  Short-Term Functional Goals: Time Frame: 30 days  1. Pt will be independent in elevation and skin care  2. Pt will be independent with scar massage  3. Pt will have functional range of IP joint, forearm, elbow and shoulder  4. Pt will be independent with splint donning and doffing  Discharge Goals: Time Frame: 60 days  1. Pt will wean off splint appropriately  2. Pt will be independent with progressive light active CMC ROM program  3. At s/p 9 weeks, patient will  begin progressive strengthening of thumb, hand and UE  4. Patient will be independent with HEP    OUTCOME MEASURE:   Tool Used: Disabilities of the Arm, Shoulder and Hand (DASH) Questionnaire - Quick Version  Score:  Initial: 51/55  Most Recent: X/55 (Date: -- )   Interpretation of Score: The DASH is designed to measure the activities of daily living in person's with upper extremity dysfunction or pain. Each section is scored on a 1-5 scale, 5 representing the greatest disability. The scores of each section are added together for a total score of 55. Score 11 12-19 20-28 29-37 38-45 46-54 55   Modifier CH CI CJ CK CL CM CN     ? Carrying, Moving, and Handling Objects:     - CURRENT STATUS: CM - 80%-99% impaired, limited or restricted    - GOAL STATUS: CI - 1%-19% impaired, limited or restricted    - D/C STATUS:  ---------------To be determined---------------      MEDICAL NECESSITY:   · Patient is expected to demonstrate progress in strength and range of motion to increase independence with functional activity. REASON FOR SERVICES/OTHER COMMENTS:  · Patient continues to require skilled intervention due to [facility] joint surgery. Total Duration:  OT Patient Time In/Time Out  Time In: 1115  Time Out: 9879(7364)    Rehabilitation Potential For Stated Goals: Good  Regarding Darleen Cornejo's therapy, I certify that the treatment plan above will be carried out by a therapist or under their direction.   Thank you for this referral,  Guzman Donovan, OT     Referring Physician Signature: Sawyer Black MD _______________________________ Date _____________     PAIN/SUBJECTIVE:   Initial: Pain Intensity 1: 4   Post Session:  4/10   OCCUPATIONAL PROFILE & HISTORY:   History of Injury/Illness (Reason for Referral):      Ms. Melany Mesa presents s/p 10 days thumb surgery for CMC osteoarthritis. 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 She had her surgical sutures removed today and presents with thumb and wrist immobilized in a forearm based thumb spica splint with IP free. She is here for rehabilitation of her hand, Post surgical protocol provided from physician. Will see patient 1x a week per protocol. Past Medical History/Comorbidities:   Ms. Melany Mesa  has no past medical history on file. Social History/Living Environment:   Patient lives with   Prior Level of Function/Work/Activity:  Independent  Dominant Side:         RIGHT   Ambulatory/Rehab Services H2 Model Falls Risk Assessment   Risk Factors:       No Risk Factors Identified Ability to Rise from Chair:       (0)  Ability to rise in a single movement   Falls Prevention Plan:       Physical Limitations to Exercise (specify): Thumb immobility   Total: (5 or greater = High Risk): 0   ©2010 [facility] Patent #[id number]. Federal Law prohibits the replication, distribution or use without written permission from [facility]   Current Medications:     No current outpatient medications on file.    Date Last Reviewed:  12/2/2020     Complexity Level: Expanded review of therapy/medical records (1-2):  MODERATE COMPLEXITY   ASSESSMENT OF OCCUPATIONAL PERFORMANCE:   ROM:          Right shoulder, elbow, forearm WFL        Right thumb IP joint 0/60        Wrist and CMC deferred  Strength:          Deferred  Skin Integrity:          Surgical scar healing in; 2+ volar forearm edema   Physical Skills Involved:  1. Range of Motion  2. Strength  3. Pain (acute)  4. Edema  5. Skin Integrity Cognitive Skills Affected (resulting in the inability to perform in a timely and safe manner):  1. N/A Psychosocial Skills Affected:  1.  Habits/Routines   Number of elements that affect the Plan of Care[de-identified] 3-5:  MODERATE COMPLEXITY   CLINICAL DECISION MAKING:   Clinical Decision-Making Assessment: Will proceed with therapy per protocol provided   Assessment process, impact of co-morbidities, assessment modification\need for assistance, and selection of interventions: Analytical Complexity:MODERATE COMPLEXITY

## 2020-12-08 NOTE — THERAPY DISCHARGE
Shola Current  : 1965  Primary: [facility]  Secondary:  [address] at [facility]  [address], [address]  [phone]   [phone]        OUTPATIENT OCCUPATIONAL THERAPY:Discharge Summary 2020   ICD-10: Treatment Diagnosis: M25.60, Joint stiffness  Unilateral primary OA of first carpometacarpal joint, right hand M18.11  Primary OA, right wrist, M19.031  Precautions/Allergies:   Patient has no allergy information on record. TREATMENT PLAN:  Effective Dates: 2020 TO 2021 (60 days). Frequency/Duration: 1 time a week for 60 Day(s) MEDICAL/REFERRING DIAGNOSIS:  Unilateral primary osteoarthritis of first carpometacarpal joint, right hand [M18.11]  Primary osteoarthritis, right wrist [M19.031]   DATE OF ONSET: 2020  REFERRING PHYSICIAN: Vickie Pitts MD MD Orders: Evaluate and treat  Return MD Appointment: 2020   DISCHARGE:  Ms. David Goetz has been transferred to [facility] for ongoing therapy. She is discharged from this cllinic, after having the evaluation 2020. INITIAL ASSESSMENT:  Ms. David Goetz presents s/p 10 days thumb surgery for [facility] [place] oste[facility]. 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 She had her surgical sutures removed today and presents with thumb and wrist immobilized in a forearm based thumb spica splint with IP free. She is here for rehabilitation of her hand, Post surgical protocol provided from physician. Will see patient 1x a week per protocol. PROBLEM LIST (Impacting functional limitations):  1. Decreased Strength  2. Decreased Flexibility/Joint Mobility  3. Edema/Girth  4. Decreased Knowledge of Precautions  5. Decreased [place] with Home Exercise Program INTERVENTIONS PLANNED: (Treatment may consist of any combination of the following)  1.  Manual therapy training  2. Modalities  3. Splinting  4. Therapeutic activity  5. Therapeutic exercise     GOALS: (Goals have been discussed and agreed upon with patient.)  Short-Term Functional Goals: Time Frame: 30 days  1. Pt will be independent in elevation and skin care  Goal met  2. Pt will be independent with scar massage  Goal met  3. Pt will have functional range of IP joint, forearm, elbow and shoulder  Goal met  4. Pt will be independent with splint donning and doffing Goal met  Discharge Goals: Time Frame: 60 days  1. Pt will wean off splint appropriately  Not met  2. Pt will be independent with progressive light active CMC ROM program  Not met  3. At s/p 9 weeks, patient will  begin progressive strengthening of thumb, hand and UE  Not met  4.  Patient will be independent with HEP  Not met

## 2020-12-09 ENCOUNTER — HOSPITAL ENCOUNTER (OUTPATIENT)
Dept: PHYSICAL THERAPY | Age: 55
End: 2020-12-09
Payer: COMMERCIAL

## 2020-12-16 ENCOUNTER — APPOINTMENT (OUTPATIENT)
Dept: PHYSICAL THERAPY | Age: 55
End: 2020-12-16
Payer: COMMERCIAL

## 2020-12-30 ENCOUNTER — APPOINTMENT (OUTPATIENT)
Dept: PHYSICAL THERAPY | Age: 55
End: 2020-12-30
Payer: COMMERCIAL

## 2021-01-06 ENCOUNTER — APPOINTMENT (OUTPATIENT)
Dept: PHYSICAL THERAPY | Age: 56
End: 2021-01-06